# Patient Record
Sex: FEMALE | Race: BLACK OR AFRICAN AMERICAN | NOT HISPANIC OR LATINO | Employment: OTHER | URBAN - NONMETROPOLITAN AREA
[De-identification: names, ages, dates, MRNs, and addresses within clinical notes are randomized per-mention and may not be internally consistent; named-entity substitution may affect disease eponyms.]

---

## 2020-03-28 ENCOUNTER — HISTORICAL (OUTPATIENT)
Dept: ADMINISTRATIVE | Facility: HOSPITAL | Age: 60
End: 2020-03-28

## 2020-11-01 ENCOUNTER — HISTORICAL (OUTPATIENT)
Dept: ADMINISTRATIVE | Facility: HOSPITAL | Age: 60
End: 2020-11-01

## 2020-12-04 ENCOUNTER — HISTORICAL (OUTPATIENT)
Dept: ADMINISTRATIVE | Facility: HOSPITAL | Age: 60
End: 2020-12-04

## 2020-12-31 ENCOUNTER — HISTORICAL (OUTPATIENT)
Dept: ADMINISTRATIVE | Facility: HOSPITAL | Age: 60
End: 2020-12-31

## 2021-01-12 ENCOUNTER — HISTORICAL (OUTPATIENT)
Dept: ADMINISTRATIVE | Facility: HOSPITAL | Age: 61
End: 2021-01-12

## 2022-03-25 LAB
AGE: 63
ALBUMIN SERPL-MCNC: 4.7 G/DL (ref 3.4–5)
ALBUMIN/GLOB SERPL: 1.6 {RATIO}
ALP SERPL-CCNC: 134 U/L (ref 50–144)
ALT SERPL-CCNC: 21 U/L (ref 1–45)
ANION GAP SERPL CALC-SCNC: 8 MMOL/L (ref 2–13)
AST SERPL-CCNC: 29 U/L (ref 14–36)
BASOPHILS # BLD AUTO: 0.05 10*3/UL (ref 0.01–0.08)
BASOPHILS NFR BLD AUTO: 1.2 % (ref 0.1–1.2)
BILIRUB SERPL-MCNC: 0.51 MG/DL (ref 0–1)
BUN SERPL-MCNC: 16 MG/DL (ref 7–20)
CALCIUM SERPL-MCNC: 9.8 MG/DL (ref 8.4–10.2)
CHLORIDE SERPL-SCNC: 106 MMOL/L (ref 94–110)
CHOLEST SERPL-MCNC: 146 MG/DL (ref 0–200)
CO2 SERPL-SCNC: 27 MMOL/L (ref 21–32)
CREAT SERPL-MCNC: 0.94 MG/DL (ref 0.52–1.04)
CREAT/UREA NIT SERPL: 17 (ref 12–20)
EOSINOPHIL # BLD AUTO: 0.21 10*3/UL (ref 0.04–0.36)
EOSINOPHIL NFR BLD AUTO: 5.1 % (ref 0.7–7)
ERYTHROCYTE [DISTWIDTH] IN BLOOD BY AUTOMATED COUNT: 14.6 % (ref 11–14.5)
EST. AVERAGE GLUCOSE BLD GHB EST-MCNC: 150 MG/DL (ref 70–115)
GLOBULIN SER-MCNC: 3 G/DL (ref 2–3.9)
GLUCOSE SERPL-MCNC: 97 MG/DL (ref 70–115)
HBA1C MFR BLD: 7 % (ref 4–6)
HCT VFR BLD AUTO: 41.7 % (ref 36–48)
HDLC SERPL-MCNC: 47 MG/DL (ref 40–60)
HGB BLD-MCNC: 13.7 G/DL (ref 11.8–16)
IMM GRANULOCYTES # BLD AUTO: 0 10*3/UL (ref 0–0.03)
IMM GRANULOCYTES NFR BLD AUTO: 0 % (ref 0–0.5)
LDLC SERPL CALC-MCNC: 65.5 MG/DL (ref 30–100)
LYMPHOCYTES # BLD AUTO: 1.7 10*3/UL (ref 1.16–3.74)
LYMPHOCYTES NFR BLD AUTO: 41.2 % (ref 20–55)
MANUAL DIFF? (OHS): NORMAL
MCH RBC QN AUTO: 27.1 PG (ref 27–34)
MCHC RBC AUTO-ENTMCNC: 32.9 G/DL (ref 31–37)
MCV RBC AUTO: 82.6 FL (ref 79–99)
MONOCYTES # BLD AUTO: 0.43 10*3/UL (ref 0.24–0.36)
MONOCYTES NFR BLD AUTO: 10.4 % (ref 4.7–12.5)
NEUTROPHILS # BLD AUTO: 1.74 10*3/UL (ref 1.56–6.13)
NEUTROPHILS NFR BLD AUTO: 42.1 % (ref 37–73)
PLATELET # BLD AUTO: 393 10*3/UL (ref 140–371)
PMV BLD AUTO: 11.3 FL (ref 9.4–12.4)
POTASSIUM SERPL-SCNC: 5.9 MMOL/L (ref 3.5–5.1)
PROT SERPL-MCNC: 7.7 G/DL (ref 6.3–8.2)
RBC # BLD AUTO: 5.05 10*6/UL (ref 4–5.1)
SODIUM SERPL-SCNC: 141 MMOL/L (ref 135–145)
TRIGL SERPL-MCNC: 106 MG/DL (ref 30–200)
TSH SERPL-ACNC: 2.35 M[IU]/L (ref 0.36–3.74)
WBC # SPEC AUTO: 4.1 10*3/UL (ref 4–11.5)

## 2022-05-15 ENCOUNTER — HISTORICAL (OUTPATIENT)
Dept: ADMINISTRATIVE | Facility: HOSPITAL | Age: 62
End: 2022-05-15

## 2022-05-21 NOTE — HISTORICAL [facility]ER
This is a historical note converted from Ramses. Formatting and pictures may have been removed.  Please reference Ramses for original formatting and attached multimedia. Chief Complaint  Est. Care-HTN, DM  History of Present Illness  61 BF PMH HTN, CAD, A. fib, DM, MDD,?Mild mitral valve regurgitation left ventricular systolic dysfunction, venous insufficiency bilateral lower extremities, former?smoker, history of DVT in office to establish care.?She has no complaints today.  ?  She is established with cardiology, Dr. Farr.??She was last seen on 3/15/2022 and instructed to follow up in 4 months.? She did quit smoking about 2 months ago.? She used to be a 1/2 PPD since she was 14 years old.?  ?  She does check her blood sugar about once a week.? Runs about 130.? Reports depression is controlled with Wellbutrin and has been for years.? If she stops the medications then she gets to thinking about?how shes getting older and this makes her feel very sad.? She denies SI/HI.? She takes Tylenol PM for sleep and works well.  Review of Systems  Constitutional:?no fever, no fatigue, no weakness  Respiratory:?no cough, no wheezing, no shortness of breath  Cardiovascular:?no chest pain, no palpitations, no edema  Gastrointestinal:?no nausea, no vomiting, no diarrhea, no constipation. No abdominal pain  Musculoskeletal:?no muscle/joint pain, no joint swelling  Neurologic: no headache, no dizziness, no weakness or numbness  Psych:?no depression, no anxiety, no trouble sleeping  Physical Exam  Vitals & Measurements  T:?36.7? ?C (Oral)? HR:?61(Peripheral)? BP:?90/60? SpO2:?95%?  HT:?168.00?cm? WT:?107.100?kg? BMI:?37.95?  General:?overweight in no acute distress  Neck: full range of motion, no thyromegaly or lymphadenopathy  Respiratory:?clear to auscultation bilaterally  Cardiovascular:?regular rate and rhythm without murmurs, gallops or rubs  Gastrointestinal:?soft, non-tender, non-distended with normal bowel sounds, without  masses to palpation  Musculoskeletal:?full range of motion of all extremities/spine without limitation or discomfort  Integumentary: no rashes or skin lesions present  Neurologic: cranial nerves intact, no signs of peripheral neurological deficit, motor/sensory function intact  Assessment/Plan  1.?HTN - Hypertension ? I10  2.?DM - Diabetes mellitus ? E11.9  3.?Asthma ? J45.909  4.?HLD (hyperlipidemia) ? E78.5  5.?Chronic atrial fibrillation ? I48.20  6.?Mild mitral valve regurgitation ? I34.0  7.?Left ventricular systolic dysfunction ? I51.9  8.?Former smoker ? Z87.891  9.?History of DVT of lower extremity ? Z86.718  10.?Chronic venous insufficiency ? I87.2  Orders:  Clinic Follow up, *Est. 04/15/22 3:00:00 CDT, Order for future visit, HTN - Hypertension  DM - Diabetes mellitus  Asthma  HLD (hyperlipidemia)  Chronic atrial fibrillation  Mild mitral valve regurgitation  Left ventricular systolic dysfunction  Former smoker  H...  Office/Outpatient Visit Level 2 [facility] [id number] , HTN - Hypertension  DM - Diabetes mellitus  Asthma  HLD (hyperlipidemia)  Chronic atrial fibrillation  Mild mitral valve regurgitation  Left ventricular systolic dysfunction  Former smoker  History of DVT of lower extremity  Chronic venous in...  ?  continue current medications  keep specialist appointments  fasting labs to be collected today  RTC 2-3 weeks for results and wellness visit.  ?  Referrals  Clinic Follow up, *Est. 04/15/22 3:00:00 CDT, Order for future visit, HTN - Hypertension  DM - Diabetes mellitus  Asthma  HLD (hyperlipidemia)  Chronic atrial fibrillation  Mild mitral valve regurgitation  Left ventricular systolic dysfunction  Former smoker  H...   Problem List/Past Medical History  Ongoing  Asthma  Chronic atrial fibrillation  Chronic venous insufficiency  DM - Diabetes mellitus  Former smoker  History of DVT of lower extremity  HLD (hyperlipidemia)  HTN - Hypertension  Left ventricular systolic  dysfunction  Mild mitral valve regurgitation  Historical  No qualifying data  Procedure/Surgical History  Hysterectomy   Medications  aspirin 81 mg oral Delayed Release (EC) tablet, 81 mg= 1 tab(s), Oral, Daily  atorvastatin 40 mg oral tablet, 40 mg= 1 tab(s), Oral, Daily  buPROPion 100 mg oral tablet, 100 mg= 1 tab(s), Oral, Daily  carvedilol 25 mg oral tablet, 37.5 mg= 1.5 tab(s), Oral, BID  Eliquis 5 mg oral tablet, 5 mg= 1 tab(s), Oral, BID  Entresto 49 mg-51 mg oral tablet, 0.5 tab(s), Oral, BID  isosorbide MONOnitrate 30 mg oral tablet, Extended Release, 30 mg= 1 tab(s), Oral, Once  Jardiance 10 mg oral tablet, 10 mg= 1 tab(s), Oral, Daily  ProAir HFA 90 mcg/inh inhalation aerosol with adapter, 2 puff(s), INH, q4hr, PRN  Tylenol Extra Strength  mg-25 mg oral tablet, 2 tab(s), Oral, Once a day (at bedtime), PRN  Allergies  No Known Medication Allergies  Social History  Abuse/Neglect  No, 03/25/2022  Tobacco  Former smoker, quit more than 30 days ago, No, 03/25/2022  Family History  Brain tumor: Brother.  Diabetes mellitus type 2: Brother.  Heart disease: Mother.  Renal failure syndrome.: Brother.  Immunizations  Vaccine Date Status   COVID-19 mRNA, LNP-S, PF - Moderna 12/01/2021 Recorded   influenza virus vaccine, inactivated 09/24/2021 Recorded   COVID-19 MRNA, LNP-S, PF- Pfizer 03/26/2021 Recorded   COVID-19 MRNA, LNP-S, PF- Pfizer 03/05/2021 Recorded   influenza virus vaccine, inactivated 10/07/2019 Recorded   influenza virus vaccine, inactivated 11/14/2018 Recorded   influenza virus vaccine, inactivated 01/20/2018 Recorded   Health Maintenance  Health Maintenance  ???Pending?(in the next year)  ??? ??OverDue  ??? ? ? ?Influenza Vaccine due??10/01/21??and every 1??day(s)  ??? ? ? ?Alcohol Misuse Screening due??01/02/22??and every 1??year(s)  ??? ? ? ?Colorectal Screening due??01/15/22??and every 1??year(s)  ??? ??Due?  ??? ? ? ?ADL Screening due??03/25/22??and every 1??year(s)  ??? ? ? ?Asthma  Management-Asthma Education due??03/25/22??and every 6??month(s)  ??? ? ? ?Asthma Management-Spirometry due??03/25/22??Variable frequency  ??? ? ? ?Asthma Management-Ross Peak Flow due??03/25/22??Variable frequency  ??? ? ? ?Asthma Management-Written Action Plan due??03/25/22??and every 6??month(s)  ??? ? ? ?Breast Cancer Screening due??03/25/22??Unknown Frequency  ??? ? ? ?Diabetes Maintenance-Eye Exam due??03/25/22??Unknown Frequency  ??? ? ? ?Diabetes Maintenance-Fasting Lipid Profile due??03/25/22??Variable frequency  ??? ? ? ?Diabetes Maintenance-Foot Exam due??03/25/22??Unknown Frequency  ??? ? ? ?Diabetes Maintenance-HgbA1c due??03/25/22??Unknown Frequency  ??? ? ? ?Diabetes Maintenance-Serum Creatinine due??03/25/22??Variable frequency  ??? ? ? ?HF-Heart Failure Education due??03/25/22??Unknown Frequency  ??? ? ? ?HF-LVEF due??03/25/22??Unknown Frequency  ??? ? ? ?Hypertension Management-Education due??03/25/22??and every 1??year(s)  ??? ? ? ?Medicare Annual Wellness Exam due??03/25/22??and every 1??year(s)  ??? ? ? ?Tetanus Vaccine due??03/25/22??and every 10??year(s)  ??? ? ? ?Zoster Vaccine due??03/25/22??Unknown Frequency  ??? ??Due In Future?  ??? ? ? ?Obesity Screening not due until??01/01/23??and every 1??year(s)  ??? ? ? ?Hypertension Management-BMP not due until??03/07/23??and every 1??year(s)  ???Satisfied?(in the past 1 year)  ??? ??Satisfied?  ??? ? ? ?Aspirin Therapy for CVD Prevention on??03/25/22.  ??? ? ? ?Asthma Management-Asthma Medication Prescribed on??03/25/22.  ??? ? ? ?Blood Pressure Screening on??03/25/22.??Satisfied by Roslyn Deleon.  ??? ? ? ?Body Mass Index Check on??03/25/22.??Satisfied by Roslyn Deleon.  ??? ? ? ?Coronary Artery Disease Maintenance-Antiplatelet Agent Prescribed on??03/25/22.  ??? ? ? ?Depression Screening on??03/25/22.??Satisfied by Roslyn Deleon.  ??? ? ? ?Diabetes Maintenance-Medication Prescribed on??03/25/22.  ??? ? ? ?Hypertension  Management-Blood Pressure on??03/25/22.??Satisfied by Roslyn Deleon.  ??? ? ? ?Influenza Vaccine on??03/25/22.??Satisfied by Roslyn Deleon.  ??? ? ? ?Obesity Screening on??03/25/22.??Satisfied by Roslyn Deleon.  ?

## 2022-06-07 LAB — NONINV COLON CA DNA+OCC BLD SCRN STL QL: NEGATIVE

## 2022-06-10 ENCOUNTER — HISTORICAL (OUTPATIENT)
Dept: ADMINISTRATIVE | Facility: HOSPITAL | Age: 62
End: 2022-06-10

## 2023-01-22 ENCOUNTER — HOSPITAL ENCOUNTER (EMERGENCY)
Facility: HOSPITAL | Age: 63
Discharge: HOME | End: 2023-01-22
Attending: FAMILY MEDICINE
Payer: COMMERCIAL

## 2023-01-22 VITALS
HEIGHT: 66 IN | SYSTOLIC BLOOD PRESSURE: 132 MMHG | WEIGHT: 256 LBS | TEMPERATURE: 97 F | DIASTOLIC BLOOD PRESSURE: 97 MMHG | BODY MASS INDEX: 41.14 KG/M2 | OXYGEN SATURATION: 100 % | RESPIRATION RATE: 22 BRPM | HEART RATE: 72 BPM

## 2023-01-22 DIAGNOSIS — I48.11 LONGSTANDING PERSISTENT ATRIAL FIBRILLATION: Primary | ICD-10-CM

## 2023-01-22 DIAGNOSIS — R06.02 SOB (SHORTNESS OF BREATH): ICD-10-CM

## 2023-01-22 DIAGNOSIS — R07.9 CHEST PAIN: ICD-10-CM

## 2023-01-22 DIAGNOSIS — I50.9 CONGESTIVE HEART FAILURE, UNSPECIFIED HF CHRONICITY, UNSPECIFIED HEART FAILURE TYPE: ICD-10-CM

## 2023-01-22 LAB
ALBUMIN SERPL-MCNC: 4.2 G/DL (ref 3.4–5)
ALBUMIN/GLOB SERPL: 1.2 RATIO
ALP SERPL-CCNC: 125 UNIT/L (ref 50–144)
ALT SERPL-CCNC: 27 UNIT/L (ref 1–45)
AST SERPL-CCNC: 28 UNIT/L (ref 14–36)
BASOPHILS # BLD AUTO: 0.04 X10(3)/MCL (ref 0.01–0.08)
BASOPHILS NFR BLD AUTO: 0.9 % (ref 0.1–1.2)
BILIRUBIN DIRECT+TOT PNL SERPL-MCNC: 0.6 MG/DL (ref 0–1)
BNP BLD-MCNC: 161 PG/ML (ref 10–450)
BUN SERPL-MCNC: 8 MG/DL (ref 7–20)
CALCIUM SERPL-MCNC: 9.4 MG/DL (ref 8.4–10.2)
CHLORIDE SERPL-SCNC: 107 MMOL/L (ref 98–110)
CO2 SERPL-SCNC: 27 MMOL/L (ref 21–32)
CREAT SERPL-MCNC: 0.94 MG/DL (ref 0.66–1.25)
EOSINOPHIL # BLD AUTO: 0.27 X10(3)/MCL (ref 0.04–0.36)
EOSINOPHIL NFR BLD AUTO: 6.3 % (ref 0.7–7)
ERYTHROCYTE [DISTWIDTH] IN BLOOD BY AUTOMATED COUNT: 15 % (ref 11–14.5)
GFR SERPLBLD CREATININE-BSD FMLA CKD-EPI: 68 MLS/MIN/1.73/M2
GLOBULIN SER-MCNC: 3.5 GM/DL (ref 2–3.9)
GLUCOSE SERPL-MCNC: 119 MG/DL (ref 70–115)
HCT VFR BLD AUTO: 38.4 % (ref 36–48)
HGB BLD-MCNC: 12.9 GM/DL (ref 11.8–16)
IMM GRANULOCYTES # BLD AUTO: 0.01 X10(3)/MCL (ref 0–0.03)
IMM GRANULOCYTES NFR BLD AUTO: 0.2 % (ref 0–0.5)
INR BLD: 0.99
LYMPHOCYTES # BLD AUTO: 1.7 X10(3)/MCL (ref 1.16–3.74)
LYMPHOCYTES NFR BLD AUTO: 39.4 % (ref 20–55)
MCH RBC QN AUTO: 26.7 PG (ref 27–34)
MCV RBC AUTO: 79.3 FL (ref 79–99)
MEAN CELL HEMOGLOBIN CONCENTRATION (OHS) G/DL: 33.6 G/DL (ref 31–37)
MONOCYTES # BLD AUTO: 0.44 X10(3)/MCL (ref 0.24–0.36)
MONOCYTES NFR BLD AUTO: 10.2 % (ref 4.7–12.5)
NEUTROPHILS # BLD AUTO: 1.86 X10(3)/MCL (ref 1.56–6.13)
NEUTROPHILS NFR BLD AUTO: 43 % (ref 37–73)
NRBC BLD AUTO-RTO: 0 % (ref 0–1)
PLATELET # BLD AUTO: 363 X10(3)/MCL (ref 140–371)
PMV BLD AUTO: 11.2 FL (ref 9.4–12.4)
POTASSIUM SERPL-SCNC: 4.4 MMOL/L (ref 3.5–5.1)
PROT SERPL-MCNC: 7.7 GM/DL (ref 6.3–8.2)
PROTHROMBIN TIME: 10.1 SECONDS
RBC # BLD AUTO: 4.84 X10(6)/MCL (ref 4–5.1)
SODIUM SERPL-SCNC: 139 MMOL/L (ref 135–145)
TROPONIN I SERPL-MCNC: <0.012 NG/ML (ref 0–0.03)
WBC # SPEC AUTO: 4.3 X10(3)/MCL (ref 4–11.5)

## 2023-01-22 PROCEDURE — 93010 EKG 12-LEAD: ICD-10-PCS | Mod: ,,, | Performed by: STUDENT IN AN ORGANIZED HEALTH CARE EDUCATION/TRAINING PROGRAM

## 2023-01-22 PROCEDURE — 36415 COLL VENOUS BLD VENIPUNCTURE: CPT | Performed by: FAMILY MEDICINE

## 2023-01-22 PROCEDURE — 83880 ASSAY OF NATRIURETIC PEPTIDE: CPT | Performed by: FAMILY MEDICINE

## 2023-01-22 PROCEDURE — 84484 ASSAY OF TROPONIN QUANT: CPT | Performed by: FAMILY MEDICINE

## 2023-01-22 PROCEDURE — 85025 COMPLETE CBC W/AUTO DIFF WBC: CPT | Performed by: FAMILY MEDICINE

## 2023-01-22 PROCEDURE — 93005 ELECTROCARDIOGRAM TRACING: CPT

## 2023-01-22 PROCEDURE — 93010 ELECTROCARDIOGRAM REPORT: CPT | Mod: ,,, | Performed by: STUDENT IN AN ORGANIZED HEALTH CARE EDUCATION/TRAINING PROGRAM

## 2023-01-22 PROCEDURE — 99285 EMERGENCY DEPT VISIT HI MDM: CPT | Mod: 25

## 2023-01-22 PROCEDURE — 80053 COMPREHEN METABOLIC PANEL: CPT | Performed by: FAMILY MEDICINE

## 2023-01-22 PROCEDURE — 85610 PROTHROMBIN TIME: CPT | Performed by: FAMILY MEDICINE

## 2023-01-22 RX ORDER — BUPROPION HYDROCHLORIDE 100 MG/1
100 TABLET ORAL DAILY
COMMUNITY
Start: 2022-12-05 | End: 2023-02-28 | Stop reason: SDUPTHER

## 2023-01-22 RX ORDER — ASPIRIN 81 MG/1
81 TABLET ORAL DAILY
COMMUNITY
Start: 2022-03-25

## 2023-01-22 RX ORDER — ISOSORBIDE MONONITRATE 30 MG/1
30 TABLET, EXTENDED RELEASE ORAL DAILY
COMMUNITY
Start: 2022-11-16 | End: 2023-05-09 | Stop reason: SDUPTHER

## 2023-01-22 RX ORDER — SACUBITRIL AND VALSARTAN 49; 51 MG/1; MG/1
1 TABLET, FILM COATED ORAL 2 TIMES DAILY
COMMUNITY
Start: 2022-12-05 | End: 2023-02-28 | Stop reason: DRUGHIGH

## 2023-01-22 RX ORDER — ATORVASTATIN CALCIUM 40 MG/1
40 TABLET, FILM COATED ORAL NIGHTLY
COMMUNITY
Start: 2022-03-25

## 2023-01-22 RX ORDER — CARVEDILOL 25 MG/1
25 TABLET ORAL
COMMUNITY
Start: 2022-03-25 | End: 2023-07-19 | Stop reason: SDUPTHER

## 2023-01-22 RX ORDER — EMPAGLIFLOZIN 10 MG/1
10 TABLET, FILM COATED ORAL
COMMUNITY
Start: 2022-11-16 | End: 2023-02-28

## 2023-01-22 RX ORDER — ALBUTEROL SULFATE 90 UG/1
2 AEROSOL, METERED RESPIRATORY (INHALATION) EVERY 4 HOURS PRN
COMMUNITY
Start: 2022-03-25 | End: 2023-10-16 | Stop reason: SDUPTHER

## 2023-01-22 NOTE — ED PROVIDER NOTES
Encounter Date: 1/22/2023       History     Chief Complaint   Patient presents with    Shortness of Breath     C/o sob that is worse when lying down flat for the past couple weeks, pt stopped taking her jardiance a couple weeks ago but restarted it x 3 days ago     c/o sob that is worse when lying down flat for the past couple weeks, pt stopped taking her jardiance a couple weeks ago but restarted it x 3 days ago pt has hx of atrial fibrillation she follows the cardiologist pt is on eliquis and carvedilol for rate control       Review of patient's allergies indicates:  No Known Allergies  Past Medical History:   Diagnosis Date    Anxiety disorder, unspecified     CHF (congestive heart failure)     COPD (chronic obstructive pulmonary disease)     Diabetes mellitus      No past surgical history on file.  No family history on file.  Social History     Tobacco Use    Smoking status: Former     Types: Cigarettes    Smokeless tobacco: Never   Substance Use Topics    Alcohol use: Not Currently     Comment: pt quit drinking approx 3 weeks ago     Review of Systems   Constitutional:  Positive for diaphoresis.   Respiratory:  Positive for chest tightness and shortness of breath.    Cardiovascular:  Positive for palpitations.   Musculoskeletal:  Positive for neck pain.   All other systems reviewed and are negative.    Physical Exam     Initial Vitals [01/22/23 1239]   BP Pulse Resp Temp SpO2   118/83 92 18 97.2 °F (36.2 °C) 97 %      MAP       --         Physical Exam    Constitutional: She appears well-developed.   HENT:   Head: Normocephalic.   Eyes: Pupils are equal, round, and reactive to light.   Neck:   Normal range of motion.  Cardiovascular:  Normal rate and regular rhythm.           No murmur heard.  Pulmonary/Chest: Breath sounds normal. No respiratory distress. She has no wheezes. She has no rhonchi. She has no rales.   Musculoskeletal:         General: Normal range of motion.      Cervical back: Normal range of  motion.     Neurological: She is alert and oriented to person, place, and time.   Skin: Skin is warm.   Psychiatric: She has a normal mood and affect.       ED Course   Procedures  Labs Reviewed   COMPREHENSIVE METABOLIC PANEL - Abnormal; Notable for the following components:       Result Value    Glucose Level 119 (*)     All other components within normal limits   CBC WITH DIFFERENTIAL - Abnormal; Notable for the following components:    MCH 26.7 (*)     RDW 15.0 (*)     Mono # 0.44 (*)     All other components within normal limits   TROPONIN I - Normal   NT-PRO NATRIURETIC PEPTIDE - Normal   CBC W/ AUTO DIFFERENTIAL    Narrative:     The following orders were created for panel order CBC auto differential.  Procedure                               Abnormality         Status                     ---------                               -----------         ------                     CBC with Differential[[id number]]        Abnormal            Final result                 Please view results for these tests on the individual orders.   PROTIME-INR    Narrative:     Protimes are used to monitor anticoagulant agents such as warfarin. PT INR values are based on the current patient normal mean and the DASHAWN value for the specific instrument reagent used.  **Routine theraputic target values for the INR are 2.0-3.0**     EKG Readings: (Independently Interpreted)   Initial Reading: No STEMI. Rhythm: Atrial Fibrillation. Ectopy: No Ectopy.   Atrial fibrillation     Imaging Results              X-Ray Chest AP Portable (In process)                   X-Rays:   Independently Interpreted Readings:   Other Readings:  Pulmonary congestion ? Habitus changes   Medications - No data to display  Medical Decision Making:   Initial Assessment:   1 atrial fibrillation     Clinical Tests:   Lab Tests: Ordered and Reviewed  The following lab test(s) were unremarkable: CBC, CMP, Troponin and BNP       <> Summary of Lab: All labs normal bnp normal  ekg atrial fib   Radiological Study: Ordered and Reviewed  Medical Tests: Ordered and Reviewed  ED Management:  Follow up with cardiology                         Clinical Impression:   Final diagnoses:  [R07.9] Chest pain  [R06.02] SOB (shortness of breath)  [I48.11] Longstanding persistent atrial fibrillation (Primary)  [I50.9] Congestive heart failure, unspecified HF chronicity, unspecified heart failure type        ED Disposition Condition    Discharge Stable          ED Prescriptions    None       Follow-up Information       Follow up With Specialties Details Why Contact Info      In 1 week               Deedee Hernandez MD  01/22/23 5740

## 2023-01-30 ENCOUNTER — DOCUMENTATION ONLY (OUTPATIENT)
Dept: ADMINISTRATIVE | Facility: HOSPITAL | Age: 63
End: 2023-01-30
Payer: COMMERCIAL

## 2023-02-13 ENCOUNTER — TELEPHONE (OUTPATIENT)
Dept: FAMILY MEDICINE | Facility: CLINIC | Age: 63
End: 2023-02-13
Payer: COMMERCIAL

## 2023-02-14 ENCOUNTER — TELEPHONE (OUTPATIENT)
Dept: FAMILY MEDICINE | Facility: CLINIC | Age: 63
End: 2023-02-14
Payer: COMMERCIAL

## 2023-02-14 NOTE — TELEPHONE ENCOUNTER
----- Message from Liz Istre sent at 2/13/2023  3:01 PM CST -----  Regarding: refills  Pt says she called to get a refill on these supplies last week, says she called them and they told her that they didn't get a request for them from us. Pt says she needs this stuff to check her blood sugar.    Needles   Test strips          [facility] mail service  [phone]

## 2023-02-15 ENCOUNTER — TELEPHONE (OUTPATIENT)
Dept: FAMILY MEDICINE | Facility: CLINIC | Age: 63
End: 2023-02-15
Payer: COMMERCIAL

## 2023-02-15 NOTE — TELEPHONE ENCOUNTER
----- Message from Kiki Recinos sent at 2/15/2023  2:35 PM CST -----  Regarding: answering Service  Patient called and left a message with the answering service wanting to know if her Diabetic Medications was called in.     Please call     Lakesha Caballero  [phone]

## 2023-02-17 VITALS
WEIGHT: 247.56 LBS | DIASTOLIC BLOOD PRESSURE: 68 MMHG | BODY MASS INDEX: 39.79 KG/M2 | OXYGEN SATURATION: 96 % | HEIGHT: 66 IN | HEART RATE: 96 BPM | SYSTOLIC BLOOD PRESSURE: 116 MMHG | TEMPERATURE: 98 F

## 2023-02-17 RX ORDER — DEXTROMETHORPHAN POLISTIREX 30 MG/5ML
60 SUSPENSION ORAL 2 TIMES DAILY
COMMUNITY
Start: 2022-11-01 | End: 2023-02-28

## 2023-02-17 RX ORDER — DULOXETIN HYDROCHLORIDE 30 MG/1
30 CAPSULE, DELAYED RELEASE ORAL DAILY
COMMUNITY
Start: 2022-11-01 | End: 2023-02-28

## 2023-02-17 RX ORDER — DEXTROMETHORPHAN HYDROBROMIDE, GUAIFENESIN 5; 100 MG/5ML; MG/5ML
1300 LIQUID ORAL EVERY 8 HOURS PRN
COMMUNITY
Start: 2022-11-01

## 2023-02-17 RX ORDER — CHLORPHENIRAMINE MALEATE 4 MG
4 TABLET ORAL 4 TIMES DAILY
COMMUNITY
Start: 2022-11-01 | End: 2023-02-28

## 2023-02-22 ENCOUNTER — TELEPHONE (OUTPATIENT)
Dept: FAMILY MEDICINE | Facility: CLINIC | Age: 63
End: 2023-02-22
Payer: COMMERCIAL

## 2023-02-22 DIAGNOSIS — E11.9 TYPE 2 DIABETES MELLITUS WITHOUT COMPLICATION, WITH LONG-TERM CURRENT USE OF INSULIN: Primary | ICD-10-CM

## 2023-02-22 DIAGNOSIS — Z79.4 TYPE 2 DIABETES MELLITUS WITHOUT COMPLICATION, WITH LONG-TERM CURRENT USE OF INSULIN: Primary | ICD-10-CM

## 2023-02-22 NOTE — TELEPHONE ENCOUNTER
----- Message from Liz Cisneros sent at 2/22/2023 11:48 AM CST -----  Regarding: refill  [facility] pharmacy called says pt called and put in a request for a refill on her test strips, but still hasn't gotten anything from them, [facility] pharmacy said when they tried to fill the test strips it says they are discontinued, so they are wanting to know what needs to be done now.      Test strips     [facility] pharmacy        [phone]

## 2023-02-23 ENCOUNTER — TELEPHONE (OUTPATIENT)
Dept: FAMILY MEDICINE | Facility: CLINIC | Age: 63
End: 2023-02-23
Payer: COMMERCIAL

## 2023-02-23 NOTE — TELEPHONE ENCOUNTER
----- Message from Kiki Recinos sent at 2/23/2023  4:27 PM CST -----  Regarding: Answering Service  Patient left a message with the answering service stating she is needing assistance getting test strips to test her blood sugar    Lakesha Caballero  [phone]

## 2023-02-28 ENCOUNTER — OFFICE VISIT (OUTPATIENT)
Dept: FAMILY MEDICINE | Facility: CLINIC | Age: 63
End: 2023-02-28
Payer: COMMERCIAL

## 2023-02-28 VITALS
OXYGEN SATURATION: 98 % | HEART RATE: 75 BPM | WEIGHT: 255.75 LBS | HEIGHT: 68 IN | DIASTOLIC BLOOD PRESSURE: 76 MMHG | SYSTOLIC BLOOD PRESSURE: 128 MMHG | TEMPERATURE: 97 F | BODY MASS INDEX: 38.76 KG/M2

## 2023-02-28 DIAGNOSIS — I48.20 CHRONIC ATRIAL FIBRILLATION: ICD-10-CM

## 2023-02-28 DIAGNOSIS — I10 PRIMARY HYPERTENSION: ICD-10-CM

## 2023-02-28 DIAGNOSIS — I25.10 CORONARY ARTERY DISEASE, UNSPECIFIED VESSEL OR LESION TYPE, UNSPECIFIED WHETHER ANGINA PRESENT, UNSPECIFIED WHETHER NATIVE OR TRANSPLANTED HEART: ICD-10-CM

## 2023-02-28 DIAGNOSIS — F32.9 MAJOR DEPRESSIVE DISORDER, REMISSION STATUS UNSPECIFIED, UNSPECIFIED WHETHER RECURRENT: ICD-10-CM

## 2023-02-28 DIAGNOSIS — G89.29 OTHER CHRONIC PAIN: ICD-10-CM

## 2023-02-28 DIAGNOSIS — F41.1 GAD (GENERALIZED ANXIETY DISORDER): ICD-10-CM

## 2023-02-28 DIAGNOSIS — I87.2 CHRONIC VENOUS INSUFFICIENCY: ICD-10-CM

## 2023-02-28 DIAGNOSIS — E11.59 TYPE 2 DIABETES MELLITUS WITH OTHER CIRCULATORY COMPLICATION, WITHOUT LONG-TERM CURRENT USE OF INSULIN: Primary | ICD-10-CM

## 2023-02-28 PROBLEM — E78.5 HYPERLIPIDEMIA: Status: ACTIVE | Noted: 2023-02-28

## 2023-02-28 PROBLEM — Z86.718 HISTORY OF DEEP VENOUS THROMBOSIS: Status: ACTIVE | Noted: 2023-02-28

## 2023-02-28 PROBLEM — I51.9 LEFT VENTRICULAR SYSTOLIC DYSFUNCTION: Status: ACTIVE | Noted: 2023-02-28

## 2023-02-28 PROBLEM — J45.909 ASTHMA: Status: ACTIVE | Noted: 2023-02-28

## 2023-02-28 PROBLEM — I34.0 MILD MITRAL VALVE REGURGITATION: Status: ACTIVE | Noted: 2023-02-28

## 2023-02-28 PROBLEM — E11.9 DIABETES MELLITUS, TYPE 2: Status: ACTIVE | Noted: 2023-02-28

## 2023-02-28 PROBLEM — I48.91 AFIB: Status: ACTIVE | Noted: 2023-02-28

## 2023-02-28 PROBLEM — I51.89 LEFT VENTRICULAR SYSTOLIC DYSFUNCTION: Status: ACTIVE | Noted: 2023-02-28

## 2023-02-28 PROBLEM — I50.9 CONGESTIVE HEART FAILURE: Status: ACTIVE | Noted: 2023-02-28

## 2023-02-28 PROBLEM — M54.50 LOW BACK PAIN: Status: ACTIVE | Noted: 2023-02-28

## 2023-02-28 LAB
EST. AVERAGE GLUCOSE BLD GHB EST-MCNC: 151.3 MG/DL (ref 70–115)
HBA1C MFR BLD: 6.9 % (ref 4–6)

## 2023-02-28 PROCEDURE — 3074F SYST BP LT 130 MM HG: CPT | Mod: CPTII,,, | Performed by: NURSE PRACTITIONER

## 2023-02-28 PROCEDURE — 99214 PR OFFICE/OUTPT VISIT, EST, LEVL IV, 30-39 MIN: ICD-10-PCS | Mod: ,,, | Performed by: NURSE PRACTITIONER

## 2023-02-28 PROCEDURE — 3008F PR BODY MASS INDEX (BMI) DOCUMENTED: ICD-10-PCS | Mod: CPTII,,, | Performed by: NURSE PRACTITIONER

## 2023-02-28 PROCEDURE — 3078F PR MOST RECENT DIASTOLIC BLOOD PRESSURE < 80 MM HG: ICD-10-PCS | Mod: CPTII,,, | Performed by: NURSE PRACTITIONER

## 2023-02-28 PROCEDURE — 1159F MED LIST DOCD IN RCRD: CPT | Mod: CPTII,,, | Performed by: NURSE PRACTITIONER

## 2023-02-28 PROCEDURE — 4010F PR ACE/ARB THEARPY RXD/TAKEN: ICD-10-PCS | Mod: CPTII,,, | Performed by: NURSE PRACTITIONER

## 2023-02-28 PROCEDURE — 3078F DIAST BP <80 MM HG: CPT | Mod: CPTII,,, | Performed by: NURSE PRACTITIONER

## 2023-02-28 PROCEDURE — 1160F RVW MEDS BY RX/DR IN RCRD: CPT | Mod: CPTII,,, | Performed by: NURSE PRACTITIONER

## 2023-02-28 PROCEDURE — 4010F ACE/ARB THERAPY RXD/TAKEN: CPT | Mod: CPTII,,, | Performed by: NURSE PRACTITIONER

## 2023-02-28 PROCEDURE — 3074F PR MOST RECENT SYSTOLIC BLOOD PRESSURE < 130 MM HG: ICD-10-PCS | Mod: CPTII,,, | Performed by: NURSE PRACTITIONER

## 2023-02-28 PROCEDURE — 83036 HEMOGLOBIN GLYCOSYLATED A1C: CPT | Performed by: NURSE PRACTITIONER

## 2023-02-28 PROCEDURE — 1159F PR MEDICATION LIST DOCUMENTED IN MEDICAL RECORD: ICD-10-PCS | Mod: CPTII,,, | Performed by: NURSE PRACTITIONER

## 2023-02-28 PROCEDURE — 99214 OFFICE O/P EST MOD 30 MIN: CPT | Mod: ,,, | Performed by: NURSE PRACTITIONER

## 2023-02-28 PROCEDURE — 1160F PR REVIEW ALL MEDS BY PRESCRIBER/CLIN PHARMACIST DOCUMENTED: ICD-10-PCS | Mod: CPTII,,, | Performed by: NURSE PRACTITIONER

## 2023-02-28 PROCEDURE — 3008F BODY MASS INDEX DOCD: CPT | Mod: CPTII,,, | Performed by: NURSE PRACTITIONER

## 2023-02-28 RX ORDER — DULOXETIN HYDROCHLORIDE 30 MG/1
30 CAPSULE, DELAYED RELEASE ORAL DAILY
Qty: 90 CAPSULE | Refills: 0 | Status: SHIPPED | OUTPATIENT
Start: 2023-02-28 | End: 2023-06-20 | Stop reason: SDUPTHER

## 2023-02-28 RX ORDER — SACUBITRIL AND VALSARTAN 97; 103 MG/1; MG/1
1 TABLET, FILM COATED ORAL 2 TIMES DAILY
COMMUNITY
End: 2023-05-09 | Stop reason: SDUPTHER

## 2023-02-28 RX ORDER — BUPROPION HYDROCHLORIDE 100 MG/1
100 TABLET ORAL DAILY
Qty: 90 TABLET | Refills: 0 | Status: SHIPPED | OUTPATIENT
Start: 2023-02-28 | End: 2023-06-27

## 2023-02-28 NOTE — PROGRESS NOTES
"   Patient ID: [id number]     Chief Complaint: Follow-up (Check up)      HPI:     Lakesha Caballero is a 62 y.o. female here today for a past due follow up from 11/1/22 for pain, depression, and anxiety.  She was prescribed Cymbalta and instructed to return in a month.  The plan was to increase the Cymbalta to the max tolerated dose.  She thinks that the "nerve pill" helped her be less anxious.  When it ran out she did notice more anxiety.  She did not notify the office, and she did not follow up as instructed.  She does admit to some fluttering in her chest and at times it feels like air is being sucked out of her lungs.  Again she admits to anxiety.  She has gone to the emergency room on 01/22/2023 for shortness of breath.  She did subsequently follow up with Cardiology who stopped her Jardiance and increased her Entresto.  The patient reports vaginal bleeding and itching with the use of Jardiance.  She does continue to complain of joint pain, all over.  She is taking Tylenol once a day.      Past Medical History:  has a past medical history of Anxiety disorder, unspecified, Asthma, CAD (coronary artery disease), CHF (congestive heart failure), Chronic atrial fibrillation, Chronic pain, Chronic venous insufficiency, Diabetes mellitus, type 2, DVT, lower extremity, Hyperlipidemia, Hypertension, Left ventricular systolic dysfunction, Low back pain, MDD (major depressive disorder), and Mild mitral valve regurgitation.    Surgical History:  has a past surgical history that includes Hysterectomy.    Family History: family history includes Brain cancer in her brother; Diabetes type II in her brother; Heart disease in her mother; Kidney failure in her brother.    Social History:  reports that she quit smoking about 12 months ago. Her smoking use included cigarettes. She has never used smokeless tobacco. She reports that she does not currently use alcohol. She reports that she does not use drugs.    Current Outpatient " "Medications   Medication Instructions    acetaminophen (TYLENOL) 1,300 mg, Oral, Every 8 hours PRN    albuterol (PROVENTIL/VENTOLIN HFA) 90 mcg/actuation inhaler 2 puffs, Inhalation, Every 4 hours PRN    apixaban (ELIQUIS) 5 mg, Oral, 2 times daily    aspirin (ECOTRIN) 81 mg, Oral, Daily    atorvastatin (LIPITOR) 40 mg, Oral, Nightly    blood sugar diagnostic (TRUE METRIX GLUCOSE TEST STRIP) Strp 1 strip, Misc.(Non-Drug; Combo Route), 2 times daily    buPROPion (WELLBUTRIN) 100 mg, Oral, Daily    carvediloL (COREG) 25 mg, Oral, Take 1 and 1/2 tablets daily    DULoxetine (CYMBALTA) 30 mg, Oral, Daily    isosorbide mononitrate (IMDUR) 30 mg, Oral, Daily    lancets 18 gauge Misc 1 lancet, Misc.(Non-Drug; Combo Route), 2 times daily    sacubitriL-valsartan (ENTRESTO)  mg per tablet 1 tablet, Oral, 2 times daily       Patient has No Known Allergies.     Patient Care Team:  MIGUEL Dominique as PCP - General (Family Medicine)  Sahil Farr MD as Consulting Physician (Cardiology)  Kei Lantigua OD as Consulting Physician (Optometry)       Subjective:     Review of Systems    See HPI     Objective:     Visit Vitals  /76 (BP Location: Left arm, Patient Position: Sitting, BP Method: Medium (Manual))   Pulse 75   Temp 96.8 °F (36 °C) (Temporal)   Ht 5' 7.72" (1.72 m)   Wt 116 kg (255 lb 11.7 oz)   SpO2 98%   BMI 39.21 kg/m²       Physical Exam  Constitutional:       Appearance: She is obese.   HENT:      Head: Normocephalic and atraumatic.      Mouth/Throat:      Mouth: Mucous membranes are moist.   Cardiovascular:      Rate and Rhythm: Normal rate. Rhythm irregular.      Heart sounds: Murmur (at the apex) heard.   Pulmonary:      Effort: Pulmonary effort is normal.      Breath sounds: Normal breath sounds.   Musculoskeletal:      Cervical back: Normal range of motion and neck supple.   Skin:     General: Skin is warm and dry.      Capillary Refill: Capillary refill takes less than 2 seconds. "   Neurological:      General: No focal deficit present.      Mental Status: She is alert. Mental status is at baseline.   Psychiatric:         Attention and Perception: Attention normal.         Mood and Affect: Mood is anxious and depressed.         Speech: Speech normal.         Behavior: Behavior normal. Behavior is cooperative.       Labs Reviewed:     Chemistry:  Lab Results   Component Value Date     01/22/2023    K 4.4 01/22/2023    CHLORIDE 107 01/22/2023    BUN 8.0 01/22/2023    CREATININE 0.94 01/22/2023    EGFRNORACEVR 68 01/22/2023    GLUCOSE 119 (H) 01/22/2023    CALCIUM 9.4 01/22/2023    ALKPHOS 125 01/22/2023    LABPROT 7.7 01/22/2023    ALBUMIN 4.2 01/22/2023    AST 28 01/22/2023    ALT 27 01/22/2023    TSH 2.35 03/25/2022        Hematology:  Lab Results   Component Value Date    WBC 4.3 01/22/2023    RBC 4.84 01/22/2023    HGB 12.9 01/22/2023    HCT 38.4 01/22/2023    MCV 79.3 01/22/2023    MCH 26.7 (L) 01/22/2023    MCHC 33.6 01/22/2023    RDW 15.0 (H) 01/22/2023     01/22/2023    MPV 11.2 01/22/2023     HA1C 6.6  -  11/1/22      Assessment:       ICD-10-CM ICD-9-CM   1. Type 2 diabetes mellitus with other circulatory complication, without long-term current use of insulin  E11.59 250.70   2. Major depressive disorder, remission status unspecified, unspecified whether recurrent  F32.9 296.20   3. SHAVON (generalized anxiety disorder)  F41.1 300.02   4. Other chronic pain  G89.29 338.29   5. Coronary artery disease, unspecified vessel or lesion type, unspecified whether angina present, unspecified whether native or transplanted heart  I25.10 414.00   6. Primary hypertension  I10 401.9   7. Chronic atrial fibrillation  I48.20 427.31   8. Chronic venous insufficiency  I87.2 459.81        Plan:     Restart Cymbalta  Check HA1c today  We will get her the correct monitor and test strips so that she can check her blood sugar daily.  She was advised to notify the office for any fasting elevations  over 150.     Follow up in about 1 month (around 3/28/2023) for Anxiety/Depression Follow Up. In addition to their scheduled follow up, the patient has also been instructed to follow up on as needed basis.     Future Appointments   Date Time Provider Department Center   3/23/2023  2:30 PM MIGUEL Dominique FNP-C

## 2023-04-20 ENCOUNTER — OFFICE VISIT (OUTPATIENT)
Dept: FAMILY MEDICINE | Facility: CLINIC | Age: 63
End: 2023-04-20
Payer: COMMERCIAL

## 2023-04-20 VITALS
TEMPERATURE: 98 F | BODY MASS INDEX: 39.65 KG/M2 | WEIGHT: 252.63 LBS | OXYGEN SATURATION: 97 % | DIASTOLIC BLOOD PRESSURE: 86 MMHG | HEIGHT: 67 IN | SYSTOLIC BLOOD PRESSURE: 132 MMHG | HEART RATE: 76 BPM

## 2023-04-20 DIAGNOSIS — E11.59 TYPE 2 DIABETES MELLITUS WITH OTHER CIRCULATORY COMPLICATION, WITHOUT LONG-TERM CURRENT USE OF INSULIN: Primary | ICD-10-CM

## 2023-04-20 DIAGNOSIS — I48.20 CHRONIC ATRIAL FIBRILLATION: ICD-10-CM

## 2023-04-20 DIAGNOSIS — I87.2 PERIPHERAL VENOUS INSUFFICIENCY: ICD-10-CM

## 2023-04-20 DIAGNOSIS — E66.9 OBESITY, UNSPECIFIED CLASSIFICATION, UNSPECIFIED OBESITY TYPE, UNSPECIFIED WHETHER SERIOUS COMORBIDITY PRESENT: ICD-10-CM

## 2023-04-20 DIAGNOSIS — F32.9 MAJOR DEPRESSIVE DISORDER, REMISSION STATUS UNSPECIFIED, UNSPECIFIED WHETHER RECURRENT: ICD-10-CM

## 2023-04-20 DIAGNOSIS — I25.10 CORONARY ARTERY DISEASE, UNSPECIFIED VESSEL OR LESION TYPE, UNSPECIFIED WHETHER ANGINA PRESENT, UNSPECIFIED WHETHER NATIVE OR TRANSPLANTED HEART: ICD-10-CM

## 2023-04-20 DIAGNOSIS — K59.00 CONSTIPATION, UNSPECIFIED CONSTIPATION TYPE: ICD-10-CM

## 2023-04-20 DIAGNOSIS — F41.1 GAD (GENERALIZED ANXIETY DISORDER): ICD-10-CM

## 2023-04-20 PROCEDURE — 1159F MED LIST DOCD IN RCRD: CPT | Mod: ,,, | Performed by: NURSE PRACTITIONER

## 2023-04-20 PROCEDURE — 3008F BODY MASS INDEX DOCD: CPT | Mod: ,,, | Performed by: NURSE PRACTITIONER

## 2023-04-20 PROCEDURE — 3079F PR MOST RECENT DIASTOLIC BLOOD PRESSURE 80-89 MM HG: ICD-10-PCS | Mod: ,,, | Performed by: NURSE PRACTITIONER

## 2023-04-20 PROCEDURE — 1160F RVW MEDS BY RX/DR IN RCRD: CPT | Mod: ,,, | Performed by: NURSE PRACTITIONER

## 2023-04-20 PROCEDURE — 99214 OFFICE O/P EST MOD 30 MIN: CPT | Mod: ,,, | Performed by: NURSE PRACTITIONER

## 2023-04-20 PROCEDURE — 1160F PR REVIEW ALL MEDS BY PRESCRIBER/CLIN PHARMACIST DOCUMENTED: ICD-10-PCS | Mod: ,,, | Performed by: NURSE PRACTITIONER

## 2023-04-20 PROCEDURE — 3075F PR MOST RECENT SYSTOLIC BLOOD PRESS GE 130-139MM HG: ICD-10-PCS | Mod: ,,, | Performed by: NURSE PRACTITIONER

## 2023-04-20 PROCEDURE — 3079F DIAST BP 80-89 MM HG: CPT | Mod: ,,, | Performed by: NURSE PRACTITIONER

## 2023-04-20 PROCEDURE — 1159F PR MEDICATION LIST DOCUMENTED IN MEDICAL RECORD: ICD-10-PCS | Mod: ,,, | Performed by: NURSE PRACTITIONER

## 2023-04-20 PROCEDURE — 3075F SYST BP GE 130 - 139MM HG: CPT | Mod: ,,, | Performed by: NURSE PRACTITIONER

## 2023-04-20 PROCEDURE — 4010F ACE/ARB THERAPY RXD/TAKEN: CPT | Mod: ,,, | Performed by: NURSE PRACTITIONER

## 2023-04-20 PROCEDURE — 3008F PR BODY MASS INDEX (BMI) DOCUMENTED: ICD-10-PCS | Mod: ,,, | Performed by: NURSE PRACTITIONER

## 2023-04-20 PROCEDURE — 99214 PR OFFICE/OUTPT VISIT, EST, LEVL IV, 30-39 MIN: ICD-10-PCS | Mod: ,,, | Performed by: NURSE PRACTITIONER

## 2023-04-20 PROCEDURE — 4010F PR ACE/ARB THEARPY RXD/TAKEN: ICD-10-PCS | Mod: ,,, | Performed by: NURSE PRACTITIONER

## 2023-04-20 RX ORDER — SEMAGLUTIDE 1.34 MG/ML
INJECTION, SOLUTION SUBCUTANEOUS
Qty: 6 EACH | Refills: 2 | Status: SHIPPED | OUTPATIENT
Start: 2023-04-20 | End: 2023-06-20 | Stop reason: SDUPTHER

## 2023-04-20 RX ORDER — METFORMIN HYDROCHLORIDE 500 MG/1
500 TABLET, EXTENDED RELEASE ORAL NIGHTLY
Qty: 90 TABLET | Refills: 3 | Status: SHIPPED | OUTPATIENT
Start: 2023-04-20 | End: 2023-08-08

## 2023-04-20 RX ORDER — ASCORBIC ACID 500 MG
2 TABLET ORAL NIGHTLY
COMMUNITY

## 2023-04-20 NOTE — PROGRESS NOTES
Patient ID: [id number]     Chief Complaint: Follow-up (F/U on medication for anxiety and depression)      HPI:     Lakesha Post is a 62 y.o. female here today for a follow up.  She's been taking the Cymbalta consistently since her last visit.  She does note that it helps her be a little more relaxed.  She denies symptoms of depression.  She reports weight gain over the last several years and unable to lose it despite minimal intake.  She's been checking her blood sugar most mornings fasting and reports usually 120, highest reading 170.  She doesn't recall why she isn't on metformin.   She reports feeling bloated and like she has gained all the weight in her abdomen.  She does have issues with constipation.  She recently bought some over-the-counter stool softeners which she has not had tried but plans to do.      Past Medical History:  has a past medical history of Anxiety disorder, unspecified, Asthma, CAD (coronary artery disease), CHF (congestive heart failure), Chronic atrial fibrillation, Chronic pain, Chronic venous insufficiency, Diabetes mellitus, type 2, DVT, lower extremity, Hyperlipidemia, Hypertension, Left ventricular systolic dysfunction, Low back pain, MDD (major depressive disorder), and Mild mitral valve regurgitation.    Surgical History:  has a past surgical history that includes Hysterectomy.    Family History: family history includes Brain cancer in her brother; Diabetes type II in her brother; Heart disease in her mother; Kidney failure in her brother.    Social History:  reports that she quit smoking about 5 months ago. Her smoking use included cigarettes. She has never used smokeless tobacco. She reports current alcohol use. She reports that she does not use drugs.    Current Outpatient Medications   Medication Instructions    acetaminophen (TYLENOL) 1,300 mg, Oral, Every 8 hours PRN    albuterol (PROVENTIL/VENTOLIN HFA) 90 mcg/actuation inhaler 2 puffs, Inhalation, Every 4 hours PRN     "apixaban (ELIQUIS) 5 mg, Oral, 2 times daily    aspirin (ECOTRIN) 81 mg, Oral, Daily    atorvastatin (LIPITOR) 40 mg, Oral, Nightly    blood sugar diagnostic (TRUE METRIX GLUCOSE TEST STRIP) Strp 1 strip, Misc.(Non-Drug; Combo Route), 2 times daily    buPROPion (WELLBUTRIN) 100 mg, Oral, Daily    carvediloL (COREG) 25 mg, Oral, Take 1 and 1/2 tablets daily    DULoxetine (CYMBALTA) 30 mg, Oral, Daily    ibuprofen-diphenhydrAMINE cit (IBUPROFEN PM) 200-38 mg Tab 2 tablets, Oral, Nightly    isosorbide mononitrate (IMDUR) 30 mg, Oral, Daily    lancets 18 gauge Misc 1 lancet, Misc.(Non-Drug; Combo Route), 2 times daily    metFORMIN (GLUCOPHAGE-XR) 500 mg, Oral, Nightly    sacubitriL-valsartan (ENTRESTO)  mg per tablet 1 tablet, Oral, 2 times daily    semaglutide (OZEMPIC) 0.25 mg or 0.5 mg(2 mg/1.5 mL) pen injector Inject 0.25 mg into the skin every 7 days for 30 days, THEN 0.5 mg every 7 days. Start with 0.25 mg dose once weekly x 1 month, then increase to 0.5 mg dose once weekly thereafter.       Patient has No Known Allergies.     Patient Care Team:  MIGUEL Dominique as PCP - General (Family Medicine)  Sahil Farr MD as Consulting Physician (Cardiology)  Kei Lantigua OD as Consulting Physician (Optometry)       Subjective:     Review of Systems    See HPI     Objective:     Visit Vitals  /86 (BP Location: Left arm, Patient Position: Sitting, BP Method: Medium (Manual))   Pulse 76   Temp 98.2 °F (36.8 °C) (Temporal)   Ht 5' 6.93" (1.7 m)   Wt 114.6 kg (252 lb 10.4 oz)   SpO2 97%   BMI 39.65 kg/m²       Physical Exam  Constitutional:       Appearance: She is obese.   HENT:      Head: Normocephalic and atraumatic.      Mouth/Throat:      Mouth: Mucous membranes are moist.   Cardiovascular:      Rate and Rhythm: Normal rate. Rhythm irregular.      Heart sounds: Murmur (at the apex) heard.   Pulmonary:      Effort: Pulmonary effort is normal.      Breath sounds: Normal breath sounds. "   Musculoskeletal:      Cervical back: Normal range of motion and neck supple.   Skin:     General: Skin is warm and dry.      Capillary Refill: Capillary refill takes less than 2 seconds.   Neurological:      General: No focal deficit present.      Mental Status: She is alert. Mental status is at baseline.   Psychiatric:         Attention and Perception: Attention normal.         Mood and Affect: Mood is anxious. Mood is not depressed.         Speech: Speech normal.         Behavior: Behavior normal. Behavior is cooperative.     Assessment:       ICD-10-CM ICD-9-CM   1. Type 2 diabetes mellitus with other circulatory complication, without long-term current use of insulin  E11.59 250.70   2. Coronary artery disease, unspecified vessel or lesion type, unspecified whether angina present, unspecified whether native or transplanted heart  I25.10 414.00   3. Peripheral venous insufficiency  I87.2 459.81   4. Chronic atrial fibrillation  I48.20 427.31   5. SHAVON (generalized anxiety disorder)  F41.1 300.02   6. Major depressive disorder, remission status unspecified, unspecified whether recurrent  F32.9 296.20   7. Obesity, unspecified classification, unspecified obesity type, unspecified whether serious comorbidity present  E66.9 278.00   8. Constipation, unspecified constipation type  K59.00 564.00        Plan:     Encouraged use of stool softeners  Start metformin and Ozempic.  Counseled on titration as tolerated  She was also advised that she could come into the office and we could show her how to give herself the 1st injection.  Encouraged low carbohydrate diet      Follow up in about 3 months (around 7/20/2023) for Follow Up, Labs Prior. In addition to their scheduled follow up, the patient has also been instructed to follow up on as needed basis.     Future Appointments   Date Time Provider Department Center   7/13/2023  8:50 AM [facility]   7/19/2023  8:30 AM Layne MATAMOROS  MIGUEL Daniel FNP-C

## 2023-05-09 ENCOUNTER — TELEPHONE (OUTPATIENT)
Dept: FAMILY MEDICINE | Facility: CLINIC | Age: 63
End: 2023-05-09
Payer: COMMERCIAL

## 2023-05-09 DIAGNOSIS — I10 HYPERTENSION, UNSPECIFIED TYPE: Primary | ICD-10-CM

## 2023-05-09 DIAGNOSIS — I48.20 CHRONIC ATRIAL FIBRILLATION: ICD-10-CM

## 2023-05-09 DIAGNOSIS — Z86.718 HISTORY OF DEEP VENOUS THROMBOSIS: ICD-10-CM

## 2023-05-09 RX ORDER — SACUBITRIL AND VALSARTAN 97; 103 MG/1; MG/1
1 TABLET, FILM COATED ORAL 2 TIMES DAILY
Qty: 90 TABLET | Refills: 0 | Status: SHIPPED | OUTPATIENT
Start: 2023-05-09 | End: 2024-02-19 | Stop reason: SDUPTHER

## 2023-05-09 RX ORDER — ISOSORBIDE MONONITRATE 30 MG/1
30 TABLET, EXTENDED RELEASE ORAL DAILY
Qty: 90 TABLET | Refills: 0 | Status: SHIPPED | OUTPATIENT
Start: 2023-05-09

## 2023-05-09 NOTE — TELEPHONE ENCOUNTER
----- Message from Nura Pritchett sent at 5/9/2023  4:03 PM CDT -----  Regarding: refill  Eliquis, entresto, isosorbide mononitrate    [facility] [place]    [phone]

## 2023-05-15 ENCOUNTER — TELEPHONE (OUTPATIENT)
Dept: FAMILY MEDICINE | Facility: CLINIC | Age: 63
End: 2023-05-15
Payer: COMMERCIAL

## 2023-05-15 NOTE — TELEPHONE ENCOUNTER
----- Message from Zari Zurita MA sent at 5/15/2023 10:46 AM CDT -----  Regarding: Diabetic Eye Exam due  Our records indicate the patient is due for a diabetic eye exam. Please find out if the patient has had one done in the last year so we can obtain those records. If not, please send a referral to an ophthalmologist of their choice.

## 2023-06-20 ENCOUNTER — TELEPHONE (OUTPATIENT)
Dept: FAMILY MEDICINE | Facility: CLINIC | Age: 63
End: 2023-06-20
Payer: COMMERCIAL

## 2023-06-20 DIAGNOSIS — F41.1 GAD (GENERALIZED ANXIETY DISORDER): Primary | ICD-10-CM

## 2023-06-20 DIAGNOSIS — E11.59 TYPE 2 DIABETES MELLITUS WITH OTHER CIRCULATORY COMPLICATION, WITHOUT LONG-TERM CURRENT USE OF INSULIN: ICD-10-CM

## 2023-06-20 RX ORDER — SEMAGLUTIDE 1.34 MG/ML
INJECTION, SOLUTION SUBCUTANEOUS
Qty: 6 EACH | Refills: 2 | Status: SHIPPED | OUTPATIENT
Start: 2023-06-20 | End: 2023-08-08 | Stop reason: DRUGHIGH

## 2023-06-20 RX ORDER — DULOXETIN HYDROCHLORIDE 30 MG/1
30 CAPSULE, DELAYED RELEASE ORAL DAILY
Qty: 90 CAPSULE | Refills: 0 | Status: SHIPPED | OUTPATIENT
Start: 2023-06-20 | End: 2023-06-27

## 2023-06-26 DIAGNOSIS — F41.1 GAD (GENERALIZED ANXIETY DISORDER): ICD-10-CM

## 2023-06-27 RX ORDER — BUPROPION HYDROCHLORIDE 100 MG/1
TABLET ORAL
Qty: 90 TABLET | Refills: 0 | Status: SHIPPED | OUTPATIENT
Start: 2023-06-27 | End: 2023-07-31

## 2023-06-27 RX ORDER — DULOXETIN HYDROCHLORIDE 30 MG/1
CAPSULE, DELAYED RELEASE ORAL
Qty: 90 CAPSULE | Refills: 0 | Status: SHIPPED | OUTPATIENT
Start: 2023-06-27 | End: 2023-07-31

## 2023-07-13 PROCEDURE — 86803 HEPATITIS C AB TEST: CPT | Performed by: NURSE PRACTITIONER

## 2023-07-13 PROCEDURE — 80053 COMPREHEN METABOLIC PANEL: CPT | Performed by: NURSE PRACTITIONER

## 2023-07-13 PROCEDURE — 80061 LIPID PANEL: CPT | Performed by: NURSE PRACTITIONER

## 2023-07-13 PROCEDURE — 83036 HEMOGLOBIN GLYCOSYLATED A1C: CPT | Performed by: NURSE PRACTITIONER

## 2023-07-13 PROCEDURE — 85025 COMPLETE CBC W/AUTO DIFF WBC: CPT | Performed by: NURSE PRACTITIONER

## 2023-07-13 PROCEDURE — 84443 ASSAY THYROID STIM HORMONE: CPT | Performed by: NURSE PRACTITIONER

## 2023-07-19 ENCOUNTER — TELEPHONE (OUTPATIENT)
Dept: FAMILY MEDICINE | Facility: CLINIC | Age: 63
End: 2023-07-19

## 2023-07-19 DIAGNOSIS — I10 PRIMARY HYPERTENSION: Primary | ICD-10-CM

## 2023-07-19 RX ORDER — CARVEDILOL 25 MG/1
25 TABLET ORAL 2 TIMES DAILY
Qty: 60 TABLET | Refills: 1 | Status: SHIPPED | OUTPATIENT
Start: 2023-07-19

## 2023-07-19 RX ORDER — CARVEDILOL 25 MG/1
25 TABLET ORAL
Status: CANCELLED | OUTPATIENT
Start: 2023-07-19

## 2023-07-19 NOTE — TELEPHONE ENCOUNTER
----- Message from Liz Cisneros sent at 7/19/2023  2:53 PM CDT -----  Regarding: refill  carvediloL (COREG) 25 MG tablet- twice a day       [facility]       [phone]

## 2023-07-29 DIAGNOSIS — F41.1 GAD (GENERALIZED ANXIETY DISORDER): ICD-10-CM

## 2023-07-31 RX ORDER — DULOXETIN HYDROCHLORIDE 30 MG/1
CAPSULE, DELAYED RELEASE ORAL
Qty: 90 CAPSULE | Refills: 0 | Status: SHIPPED | OUTPATIENT
Start: 2023-07-31 | End: 2023-10-30

## 2023-07-31 RX ORDER — BUPROPION HYDROCHLORIDE 100 MG/1
100 TABLET ORAL
Qty: 90 TABLET | Refills: 0 | Status: SHIPPED | OUTPATIENT
Start: 2023-07-31 | End: 2023-10-30

## 2023-07-31 RX ORDER — BUPROPION HYDROCHLORIDE 100 MG/1
TABLET ORAL
Qty: 90 TABLET | Refills: 0 | Status: SHIPPED | OUTPATIENT
Start: 2023-07-31 | End: 2023-08-08

## 2023-08-08 ENCOUNTER — OFFICE VISIT (OUTPATIENT)
Dept: FAMILY MEDICINE | Facility: CLINIC | Age: 63
End: 2023-08-08
Payer: COMMERCIAL

## 2023-08-08 ENCOUNTER — NURSE TRIAGE (OUTPATIENT)
Dept: ADMINISTRATIVE | Facility: CLINIC | Age: 63
End: 2023-08-08
Payer: COMMERCIAL

## 2023-08-08 VITALS
WEIGHT: 245.81 LBS | BODY MASS INDEX: 38.58 KG/M2 | SYSTOLIC BLOOD PRESSURE: 128 MMHG | OXYGEN SATURATION: 96 % | HEART RATE: 70 BPM | HEIGHT: 67 IN | TEMPERATURE: 98 F | DIASTOLIC BLOOD PRESSURE: 80 MMHG

## 2023-08-08 DIAGNOSIS — E11.59 TYPE 2 DIABETES MELLITUS WITH OTHER CIRCULATORY COMPLICATION, WITHOUT LONG-TERM CURRENT USE OF INSULIN: Primary | ICD-10-CM

## 2023-08-08 DIAGNOSIS — Z12.31 SCREENING MAMMOGRAM FOR BREAST CANCER: ICD-10-CM

## 2023-08-08 DIAGNOSIS — I10 PRIMARY HYPERTENSION: ICD-10-CM

## 2023-08-08 DIAGNOSIS — E66.9 OBESITY, UNSPECIFIED CLASSIFICATION, UNSPECIFIED OBESITY TYPE, UNSPECIFIED WHETHER SERIOUS COMORBIDITY PRESENT: ICD-10-CM

## 2023-08-08 DIAGNOSIS — Z91.89 AT RISK FOR KNOWLEDGE DEFICIT: ICD-10-CM

## 2023-08-08 PROCEDURE — 1160F PR REVIEW ALL MEDS BY PRESCRIBER/CLIN PHARMACIST DOCUMENTED: ICD-10-PCS | Mod: ,,, | Performed by: NURSE PRACTITIONER

## 2023-08-08 PROCEDURE — 3044F PR MOST RECENT HEMOGLOBIN A1C LEVEL <7.0%: ICD-10-PCS | Mod: ,,, | Performed by: NURSE PRACTITIONER

## 2023-08-08 PROCEDURE — 3074F PR MOST RECENT SYSTOLIC BLOOD PRESSURE < 130 MM HG: ICD-10-PCS | Mod: ,,, | Performed by: NURSE PRACTITIONER

## 2023-08-08 PROCEDURE — 3044F HG A1C LEVEL LT 7.0%: CPT | Mod: ,,, | Performed by: NURSE PRACTITIONER

## 2023-08-08 PROCEDURE — 3074F SYST BP LT 130 MM HG: CPT | Mod: ,,, | Performed by: NURSE PRACTITIONER

## 2023-08-08 PROCEDURE — 99214 PR OFFICE/OUTPT VISIT, EST, LEVL IV, 30-39 MIN: ICD-10-PCS | Mod: ,,, | Performed by: NURSE PRACTITIONER

## 2023-08-08 PROCEDURE — 3008F BODY MASS INDEX DOCD: CPT | Mod: ,,, | Performed by: NURSE PRACTITIONER

## 2023-08-08 PROCEDURE — 1159F PR MEDICATION LIST DOCUMENTED IN MEDICAL RECORD: ICD-10-PCS | Mod: ,,, | Performed by: NURSE PRACTITIONER

## 2023-08-08 PROCEDURE — 1159F MED LIST DOCD IN RCRD: CPT | Mod: ,,, | Performed by: NURSE PRACTITIONER

## 2023-08-08 PROCEDURE — 3079F PR MOST RECENT DIASTOLIC BLOOD PRESSURE 80-89 MM HG: ICD-10-PCS | Mod: ,,, | Performed by: NURSE PRACTITIONER

## 2023-08-08 PROCEDURE — 3008F PR BODY MASS INDEX (BMI) DOCUMENTED: ICD-10-PCS | Mod: ,,, | Performed by: NURSE PRACTITIONER

## 2023-08-08 PROCEDURE — 4010F ACE/ARB THERAPY RXD/TAKEN: CPT | Mod: ,,, | Performed by: NURSE PRACTITIONER

## 2023-08-08 PROCEDURE — 4010F PR ACE/ARB THEARPY RXD/TAKEN: ICD-10-PCS | Mod: ,,, | Performed by: NURSE PRACTITIONER

## 2023-08-08 PROCEDURE — 3079F DIAST BP 80-89 MM HG: CPT | Mod: ,,, | Performed by: NURSE PRACTITIONER

## 2023-08-08 PROCEDURE — 99214 OFFICE O/P EST MOD 30 MIN: CPT | Mod: ,,, | Performed by: NURSE PRACTITIONER

## 2023-08-08 PROCEDURE — 1160F RVW MEDS BY RX/DR IN RCRD: CPT | Mod: ,,, | Performed by: NURSE PRACTITIONER

## 2023-08-08 RX ORDER — CARVEDILOL 25 MG/1
25 TABLET ORAL 2 TIMES DAILY
Qty: 60 TABLET | Refills: 1 | Status: CANCELLED | OUTPATIENT
Start: 2023-08-08

## 2023-08-08 RX ORDER — SEMAGLUTIDE 1.34 MG/ML
1 INJECTION, SOLUTION SUBCUTANEOUS
Qty: 9 ML | Refills: 3 | Status: SHIPPED | OUTPATIENT
Start: 2023-08-08

## 2023-08-08 NOTE — PROGRESS NOTES
Patient ID: [id number]     Chief Complaint: Diabetes (With labs )      HPI:     Lakesha Post is a 62 y.o. female here today for diabetic  follow up.  Lab results reviewed and discussed with her.     Past Medical History:  has a past medical history of Anxiety disorder, unspecified, Asthma, CAD (coronary artery disease), CHF (congestive heart failure), Chronic atrial fibrillation, Chronic pain, Chronic venous insufficiency, Diabetes mellitus, type 2, DVT, lower extremity, Hyperlipidemia, Hypertension, Left ventricular systolic dysfunction, Low back pain, MDD (major depressive disorder), and Mild mitral valve regurgitation.    Surgical History:  has a past surgical history that includes Hysterectomy and Eye surgery (2021).    Family History: family history includes Alcohol abuse in her brother; Brain cancer in her brother; Diabetes type II in her brother; Heart disease in her mother; Kidney failure in her brother.    Social History:  reports that she quit smoking about 9 months ago. Her smoking use included cigarettes. She has never used smokeless tobacco. She reports current alcohol use of about 4.0 standard drinks of alcohol per week. She reports that she does not currently use drugs.    Current Outpatient Medications   Medication Instructions    acetaminophen (TYLENOL) 1,300 mg, Oral, Every 8 hours PRN    albuterol (PROVENTIL/VENTOLIN HFA) 90 mcg/actuation inhaler 2 puffs, Inhalation, Every 4 hours PRN    apixaban (ELIQUIS) 5 mg, Oral, 2 times daily    aspirin (ECOTRIN) 81 mg, Oral, Daily    atorvastatin (LIPITOR) 40 mg, Oral, Nightly    blood sugar diagnostic (TRUE METRIX GLUCOSE TEST STRIP) Strp 1 strip, Misc.(Non-Drug; Combo Route), 2 times daily    buPROPion (WELLBUTRIN) 100 mg, Oral    carvediloL (COREG) 25 mg, Oral, 2 times daily    DULoxetine (CYMBALTA) 30 MG capsule TAKE 1 CAPSULE ONCE DAILY    ibuprofen-diphenhydrAMINE cit (IBUPROFEN PM) 200-38 mg Tab 2 tablets, Oral, Nightly    isosorbide mononitrate  "(IMDUR) 30 mg, Oral, Daily    lancets 18 gauge Misc 1 lancet , Misc.(Non-Drug; Combo Route), 2 times daily    metFORMIN (GLUCOPHAGE-XR) 500 mg, Oral, Nightly    sacubitriL-valsartan (ENTRESTO)  mg per tablet 1 tablet, Oral, 2 times daily    semaglutide (OZEMPIC) 0.25 mg or 0.5 mg(2 mg/1.5 mL) pen injector Inject 0.25 mg into the skin every 7 days for 30 days, THEN 0.5 mg every 7 days. Start with 0.25 mg dose once weekly x 1 month, then increase to 0.5 mg dose once weekly thereafter.       Patient has No Known Allergies.     Patient Care Team:  Layne Daniel FNP-C as PCP - General (Family Medicine)  Sahil Farr MD as Consulting Physician (Cardiology)  Kei Lantigua OD as Consulting Physician (Optometry)       Subjective:     Review of Systems    See HPI     Objective:     Visit Vitals  /80 (BP Location: Left arm)   Pulse 70   Temp 98.1 °F (36.7 °C) (Temporal)   Ht 5' 6.93" (1.7 m)   Wt 111.5 kg (245 lb 12.8 oz)   SpO2 96%   BMI 38.58 kg/m²       Physical Exam      Labs Reviewed:     Chemistry:  Lab Results   Component Value Date     07/13/2023    K 4.6 07/13/2023    CHLORIDE 105 07/13/2023    BUN 11.0 07/13/2023    CREATININE 0.85 07/13/2023    EGFRNORACEVR 77 07/13/2023    GLUCOSE 121 (H) 07/13/2023    CALCIUM 9.5 07/13/2023    ALKPHOS 93 07/13/2023    LABPROT 7.1 07/13/2023    ALBUMIN 4.2 07/13/2023    AST 24 07/13/2023    ALT 22 07/13/2023    TSH 2.620 07/13/2023        Lab Results   Component Value Date    HGBA1C 6.6 (H) 07/13/2023        Hematology:  Lab Results   Component Value Date    WBC 5.53 07/13/2023    RBC 4.56 07/13/2023    HGB 12.2 07/13/2023    HCT 36.0 07/13/2023    MCV 78.9 (L) 07/13/2023    MCH 26.8 (L) 07/13/2023    MCHC 33.9 07/13/2023    RDW 15.1 (H) 07/13/2023     07/13/2023    MPV 11.5 07/13/2023       Lipid Panel:  Lab Results   Component Value Date    CHOL 136 07/13/2023    HDL 51 07/13/2023    LDL 65.5 03/25/2022    TRIG 94 07/13/2023    "         Assessment:       ICD-10-CM ICD-9-CM   1. Primary hypertension  I10 401.9        Plan:     1. Primary hypertension  ***       No orders of the defined types were placed in this encounter.             Medications Discontinued During This Encounter   Medication Reason    buPROPion (WELLBUTRIN) 100 MG tablet         No follow-ups on file. In addition to their scheduled follow up, the patient has also been instructed to follow up on as needed basis.     No future appointments.     Layne Daniel, LARON-C

## 2023-08-08 NOTE — PROGRESS NOTES
Patient ID: [id number]     Chief Complaint: Diabetes (With labs )      HPI:     Lakesha Post is a 62 y.o. female here today for lab results and follow up on diabetes.  Those results are reviewed and discussed with her. She reports recent appointment with cardiology and no changes.  She states that she feels well today.  Has been out of Ozempic 1 week. She called pharmacy but doesn't understand why her medicine hasn't come.  She ran out of coreg but found old bottle in the cabinet, so she started taking that.        Past Medical History:  has a past medical history of Anxiety disorder, unspecified, Asthma, CAD (coronary artery disease), CHF (congestive heart failure), Chronic atrial fibrillation, Chronic pain, Chronic venous insufficiency, Diabetes mellitus, type 2, DVT, lower extremity, Hyperlipidemia, Hypertension, Left ventricular systolic dysfunction, Low back pain, MDD (major depressive disorder), and Mild mitral valve regurgitation.    Surgical History:  has a past surgical history that includes Hysterectomy and Eye surgery (2021).    Family History: family history includes Alcohol abuse in her brother; Brain cancer in her brother; Diabetes type II in her brother; Heart disease in her mother; Kidney failure in her brother.    Social History:  reports that she quit smoking about 9 months ago. Her smoking use included cigarettes. She has never used smokeless tobacco. She reports current alcohol use of about 4.0 standard drinks of alcohol per week. She reports that she does not currently use drugs.    Current Outpatient Medications   Medication Instructions    acetaminophen (TYLENOL) 1,300 mg, Oral, Every 8 hours PRN    albuterol (PROVENTIL/VENTOLIN HFA) 90 mcg/actuation inhaler 2 puffs, Inhalation, Every 4 hours PRN    apixaban (ELIQUIS) 5 mg, Oral, 2 times daily    aspirin (ECOTRIN) 81 mg, Oral, Daily    atorvastatin (LIPITOR) 40 mg, Oral, Nightly    blood sugar diagnostic (TRUE METRIX GLUCOSE TEST STRIP) Strp 1  "strip, Misc.(Non-Drug; Combo Route), 2 times daily    buPROPion (WELLBUTRIN) 100 mg, Oral    carvediloL (COREG) 25 mg, Oral, 2 times daily    DULoxetine (CYMBALTA) 30 MG capsule TAKE 1 CAPSULE ONCE DAILY    ibuprofen-diphenhydrAMINE cit (IBUPROFEN PM) 200-38 mg Tab 2 tablets, Oral, Nightly    isosorbide mononitrate (IMDUR) 30 mg, Oral, Daily    lancets 18 gauge Misc 1 lancet , Misc.(Non-Drug; Combo Route), 2 times daily    metFORMIN (GLUCOPHAGE-XR) 500 mg, Oral, Nightly    OZEMPIC 1 mg, Subcutaneous, Every 7 days    sacubitriL-valsartan (ENTRESTO)  mg per tablet 1 tablet, Oral, 2 times daily       Patient has No Known Allergies.     Patient Care Team:  Layne Daniel FNP-C as PCP - General (Family Medicine)  Sahil Farr MD as Consulting Physician (Cardiology)  Kei Lantigua OD as Consulting Physician (Optometry)       Subjective:     Review of Systems    See HPI     Objective:     Visit Vitals  /80 (BP Location: Left arm)   Pulse 70   Temp 98.1 °F (36.7 °C) (Temporal)   Ht 5' 6.93" (1.7 m)   Wt 111.5 kg (245 lb 12.8 oz)   SpO2 96%   BMI 38.58 kg/m²       Physical Exam  Vitals reviewed.   Constitutional:       Appearance: Normal appearance.   Cardiovascular:      Rate and Rhythm: Normal rate and regular rhythm.      Heart sounds: Normal heart sounds.   Pulmonary:      Effort: Pulmonary effort is normal.      Breath sounds: Normal breath sounds.   Skin:     General: Skin is warm and dry.   Neurological:      Mental Status: She is alert and oriented to person, place, and time.   Psychiatric:         Mood and Affect: Mood normal.         Behavior: Behavior normal.         Thought Content: Thought content normal.         Judgment: Judgment normal.       Labs Reviewed:     Chemistry:  Lab Results   Component Value Date     07/13/2023    K 4.6 07/13/2023    CHLORIDE 105 07/13/2023    BUN 11.0 07/13/2023    CREATININE 0.85 07/13/2023    EGFRNORACEVR 77 07/13/2023    GLUCOSE 121 (H) " 07/13/2023    CALCIUM 9.5 07/13/2023    ALKPHOS 93 07/13/2023    LABPROT 7.1 07/13/2023    ALBUMIN 4.2 07/13/2023    AST 24 07/13/2023    ALT 22 07/13/2023    TSH 2.620 07/13/2023        Lab Results   Component Value Date    HGBA1C 6.6 (H) 07/13/2023        Hematology:  Lab Results   Component Value Date    WBC 5.53 07/13/2023    RBC 4.56 07/13/2023    HGB 12.2 07/13/2023    HCT 36.0 07/13/2023    MCV 78.9 (L) 07/13/2023    MCH 26.8 (L) 07/13/2023    MCHC 33.9 07/13/2023    RDW 15.1 (H) 07/13/2023     07/13/2023    MPV 11.5 07/13/2023     Lipid Panel:  Lab Results   Component Value Date    CHOL 136 07/13/2023    HDL 51 07/13/2023    LDL 65.5 03/25/2022    TRIG 94 07/13/2023        Assessment:       ICD-10-CM ICD-9-CM   1. Type 2 diabetes mellitus with other circulatory complication, without long-term current use of insulin  E11.59 250.70   2. Primary hypertension  I10 401.9   3. Screening mammogram for breast cancer  Z12.31 V76.12        Plan:     Increase Ozempic to 1 mg weekly since not at goal.  Mammogram ordered  Eye exam scheduled for later this month.    Advised to call cardiology for refills on Coreg.     Follow up in about 3 months (around 11/8/2023) for Wellness, Labs Prior. In addition to their scheduled follow up, the patient has also been instructed to follow up on as needed basis.

## 2023-08-08 NOTE — TELEPHONE ENCOUNTER
Call place to pt times 4 no answer. VM left and message left with sister. Encounter routed to provider.   Reason for Disposition   Message left on unidentified voice mail. Phone number verified.    Protocols used: No Contact or Duplicate Contact Call-A-OH

## 2023-08-21 ENCOUNTER — DOCUMENTATION ONLY (OUTPATIENT)
Dept: ADMINISTRATIVE | Facility: HOSPITAL | Age: 63
End: 2023-08-21
Payer: COMMERCIAL

## 2023-08-21 LAB
LEFT EYE DM RETINOPATHY: NEGATIVE
RIGHT EYE DM RETINOPATHY: NEGATIVE

## 2023-08-21 NOTE — PROGRESS NOTES
Population Health Chart Review & Patient Outreach Details:     Reason for Outreach Encounter:     [x]  Non-Compliant Report   []  Payor Report ([employer], PHN, BCBS, MSSP, MCIP, C, etc.)   []  Pre-Visit Chart Review    Patient Outreach Method:    []  Telephone Outreach Completed   [] Successful   [] Left Voicemail   [] Unable to Contact (wrong number, no voicemail)  []  [facility] Outreach Sent  []  Letter Outreach Mailed  []  Fax Sent for External Records  [x]  External Records Upload    Health Maintenance Topics Addressed and Outreach Outcomes / Actions Taken:        []      Breast Cancer Screening []  Mammo Scheduled      []  External Records Requested     []  Added Reminder to Complete to Upcoming Primary Care Appt Notes     []  Patient Declined     []  Patient Will Call Back to Schedule     []  Patient Will Schedule with External Provider / Order Routed if Applicable             []       Cervical Cancer Screening []  Pap Scheduled      []  External Records Requested     []  Added Reminder to Complete to Upcoming Primary Care Appt Notes     []  Patient Declined     []  Patient Will Call Back to Schedule     []  Patient Will Schedule with External Provider               []          Colorectal Cancer Screening []  Colonoscopy Case Request or Referral Placed     []  External Records Requested     []  Added Reminder to Complete to Upcoming Primary Care Appt Notes     []  Patient Declined     []  Patient Will Call Back to Schedule     []  Patient Will Schedule with External Provider     []  Fit Kit Mailed (add the SmartPhrase under additional notes)     []  Reminded Patient to Complete Home Test             [x]      Diabetic Eye Exam []  Eye Camera Scheduled or Optometry Referral Placed     [x]  External Records Hyperlinked     []  Added Reminder to Complete to Upcoming Primary Care Appt Notes     []  Patient Declined     []  Patient Will Call Back to Schedule     []  Patient Will Schedule with External Provider              []      Blood Pressure Control []  Primary Care Follow Up Visit Scheduled     []  Remote Blood Pressure Reading Captured     []  Added Reminder to Complete to Upcoming Primary Care Appt Notes     []  Patient Declined     []  Patient Will Call Back / Patient Will Send Portal Message with Reading     []  Patient Will Call Back to Schedule Provider Visit             []       HbA1c & Other Labs []  Lab Appt Scheduled for Due Labs     []  Primary Care Follow Up Visit Scheduled      []  Reminded Patient to Complete Home Test     []  Added Reminder to Complete to Upcoming Primary Care Appt Notes     []  Patient Declined     []  Patient Will Call Back to Schedule     []  Patient Will Schedule with External Provider / Order Routed if Applicable           []    Schedule Primary Care Appt []  Primary Care Appt Scheduled     []  Patient Declined     []  Patient Will Call Back to Schedule     []  Pt Established with External Provider & Updated Care Team             []      Medication Adherence []  Primary Care Appointment Scheduled     []  Added Reminder to Upcoming Primary Care Appt Notes     []  Patient Reminded to  Prescription     []  Patient Declined, Provider Notified if Needed     []  Sent Provider Message to Review and/or Add Exclusion to Problem List             []      Osteoporosis Screening []  DXA Appointment Scheduled     []  External Records Requested     []  Added Reminder to Complete to Upcoming Primary Care Appt Notes     []  Patient Declined     []  Patient Will Call Back to Schedule     []  Patient Will Schedule with External Provider / Order Routed if Applicable     Additional Care Coordinator Notes:     Diabetic eye exam hyperlinked  Next eye exam due 8/21/24

## 2023-10-16 ENCOUNTER — TELEPHONE (OUTPATIENT)
Dept: FAMILY MEDICINE | Facility: CLINIC | Age: 63
End: 2023-10-16
Payer: COMMERCIAL

## 2023-10-16 DIAGNOSIS — J45.909 ASTHMA, UNSPECIFIED ASTHMA SEVERITY, UNSPECIFIED WHETHER COMPLICATED, UNSPECIFIED WHETHER PERSISTENT: Primary | ICD-10-CM

## 2023-10-16 RX ORDER — ALBUTEROL SULFATE 90 UG/1
2 AEROSOL, METERED RESPIRATORY (INHALATION) EVERY 4 HOURS PRN
Qty: 18 G | Refills: 1 | Status: SHIPPED | OUTPATIENT
Start: 2023-10-16 | End: 2023-10-26 | Stop reason: SDUPTHER

## 2023-10-26 ENCOUNTER — TELEPHONE (OUTPATIENT)
Dept: FAMILY MEDICINE | Facility: CLINIC | Age: 63
End: 2023-10-26
Payer: COMMERCIAL

## 2023-10-26 DIAGNOSIS — J45.909 ASTHMA, UNSPECIFIED ASTHMA SEVERITY, UNSPECIFIED WHETHER COMPLICATED, UNSPECIFIED WHETHER PERSISTENT: ICD-10-CM

## 2023-10-26 RX ORDER — ALBUTEROL SULFATE 90 UG/1
2 AEROSOL, METERED RESPIRATORY (INHALATION) EVERY 4 HOURS PRN
Qty: 18 G | Refills: 11 | Status: SHIPPED | OUTPATIENT
Start: 2023-10-26

## 2023-10-29 DIAGNOSIS — F41.1 GAD (GENERALIZED ANXIETY DISORDER): ICD-10-CM

## 2023-10-30 RX ORDER — DULOXETIN HYDROCHLORIDE 30 MG/1
CAPSULE, DELAYED RELEASE ORAL
Qty: 90 CAPSULE | Refills: 0 | Status: SHIPPED | OUTPATIENT
Start: 2023-10-30 | End: 2024-02-08

## 2023-10-30 RX ORDER — BUPROPION HYDROCHLORIDE 100 MG/1
100 TABLET ORAL
Qty: 90 TABLET | Refills: 0 | Status: SHIPPED | OUTPATIENT
Start: 2023-10-30 | End: 2024-02-08

## 2023-12-13 ENCOUNTER — HOSPITAL ENCOUNTER (EMERGENCY)
Facility: HOSPITAL | Age: 63
Discharge: HOME | End: 2023-12-13
Attending: FAMILY MEDICINE
Payer: COMMERCIAL

## 2023-12-13 VITALS
HEART RATE: 84 BPM | RESPIRATION RATE: 20 BRPM | DIASTOLIC BLOOD PRESSURE: 88 MMHG | HEIGHT: 66 IN | SYSTOLIC BLOOD PRESSURE: 115 MMHG | BODY MASS INDEX: 40.66 KG/M2 | TEMPERATURE: 99 F | WEIGHT: 253 LBS | OXYGEN SATURATION: 100 %

## 2023-12-13 DIAGNOSIS — J44.9 CHRONIC OBSTRUCTIVE PULMONARY DISEASE, UNSPECIFIED COPD TYPE: Primary | ICD-10-CM

## 2023-12-13 DIAGNOSIS — R07.9 CHEST PAIN: ICD-10-CM

## 2023-12-13 LAB
ALBUMIN SERPL-MCNC: 4.7 G/DL (ref 3.4–5)
ALBUMIN/GLOB SERPL: 1.2 RATIO
ALP SERPL-CCNC: 95 UNIT/L (ref 50–144)
ALT SERPL-CCNC: 25 UNIT/L (ref 1–45)
ANION GAP SERPL CALC-SCNC: 9 MEQ/L (ref 2–13)
AST SERPL-CCNC: 28 UNIT/L (ref 14–36)
BASOPHILS # BLD AUTO: 0.05 X10(3)/MCL (ref 0.01–0.08)
BASOPHILS NFR BLD AUTO: 0.8 % (ref 0.1–1.2)
BILIRUB SERPL-MCNC: 0.4 MG/DL (ref 0–1)
BNP BLD-MCNC: 472 PG/ML (ref 0–124.9)
BUN SERPL-MCNC: 20 MG/DL (ref 7–20)
CALCIUM SERPL-MCNC: 9.2 MG/DL (ref 8.4–10.2)
CHLORIDE SERPL-SCNC: 105 MMOL/L (ref 98–110)
CK MB SERPL-MCNC: <0.22 NG/ML (ref 0–3.38)
CK SERPL-CCNC: 49 U/L (ref 30–135)
CO2 SERPL-SCNC: 25 MMOL/L (ref 21–32)
CREAT SERPL-MCNC: 1.04 MG/DL (ref 0.66–1.25)
CREAT/UREA NIT SERPL: 19 (ref 12–20)
D DIMER PPP IA.FEU-MCNC: 0.19 MG/L (ref 0.19–0.5)
EOSINOPHIL # BLD AUTO: 0.27 X10(3)/MCL (ref 0.04–0.36)
EOSINOPHIL NFR BLD AUTO: 4.1 % (ref 0.7–7)
ERYTHROCYTE [DISTWIDTH] IN BLOOD BY AUTOMATED COUNT: 15.3 % (ref 11–14.5)
GFR SERPLBLD CREATININE-BSD FMLA CKD-EPI: 60 MLS/MIN/1.73/M2
GLOBULIN SER-MCNC: 3.8 GM/DL (ref 2–3.9)
GLUCOSE SERPL-MCNC: 141 MG/DL (ref 70–115)
HCT VFR BLD AUTO: 38.2 % (ref 36–48)
HGB BLD-MCNC: 12.6 G/DL (ref 11.8–16)
IMM GRANULOCYTES # BLD AUTO: 0.04 X10(3)/MCL (ref 0–0.03)
IMM GRANULOCYTES NFR BLD AUTO: 0.6 % (ref 0–0.5)
LYMPHOCYTES # BLD AUTO: 2.1 X10(3)/MCL (ref 1.16–3.74)
LYMPHOCYTES NFR BLD AUTO: 31.6 % (ref 20–55)
MCH RBC QN AUTO: 26.6 PG (ref 27–34)
MCHC RBC AUTO-ENTMCNC: 33 G/DL (ref 31–37)
MCV RBC AUTO: 80.6 FL (ref 79–99)
MONOCYTES # BLD AUTO: 0.58 X10(3)/MCL (ref 0.24–0.36)
MONOCYTES NFR BLD AUTO: 8.7 % (ref 4.7–12.5)
NEUTROPHILS # BLD AUTO: 3.61 X10(3)/MCL (ref 1.56–6.13)
NEUTROPHILS NFR BLD AUTO: 54.2 % (ref 37–73)
NRBC BLD AUTO-RTO: 0 %
PLATELET # BLD AUTO: 457 X10(3)/MCL (ref 140–371)
PMV BLD AUTO: 11.4 FL (ref 9.4–12.4)
POTASSIUM SERPL-SCNC: 4.1 MMOL/L (ref 3.5–5.1)
PROT SERPL-MCNC: 8.5 GM/DL (ref 6.3–8.2)
RBC # BLD AUTO: 4.74 X10(6)/MCL (ref 4–5.1)
SODIUM SERPL-SCNC: 139 MMOL/L (ref 135–145)
TROPONIN I SERPL-MCNC: <0.012 NG/ML (ref 0–0.03)
WBC # SPEC AUTO: 6.65 X10(3)/MCL (ref 4–11.5)

## 2023-12-13 PROCEDURE — 82550 ASSAY OF CK (CPK): CPT | Performed by: FAMILY MEDICINE

## 2023-12-13 PROCEDURE — 93010 ELECTROCARDIOGRAM REPORT: CPT | Mod: ,,, | Performed by: INTERNAL MEDICINE

## 2023-12-13 PROCEDURE — 93010 EKG 12-LEAD: ICD-10-PCS | Mod: ,,, | Performed by: INTERNAL MEDICINE

## 2023-12-13 PROCEDURE — 80053 COMPREHEN METABOLIC PANEL: CPT | Performed by: FAMILY MEDICINE

## 2023-12-13 PROCEDURE — 83880 ASSAY OF NATRIURETIC PEPTIDE: CPT | Performed by: FAMILY MEDICINE

## 2023-12-13 PROCEDURE — 82553 CREATINE MB FRACTION: CPT | Performed by: FAMILY MEDICINE

## 2023-12-13 PROCEDURE — 99285 EMERGENCY DEPT VISIT HI MDM: CPT | Mod: 25

## 2023-12-13 PROCEDURE — 93005 ELECTROCARDIOGRAM TRACING: CPT

## 2023-12-13 PROCEDURE — 84484 ASSAY OF TROPONIN QUANT: CPT | Performed by: FAMILY MEDICINE

## 2023-12-13 PROCEDURE — 85025 COMPLETE CBC W/AUTO DIFF WBC: CPT | Performed by: FAMILY MEDICINE

## 2023-12-13 PROCEDURE — 85379 FIBRIN DEGRADATION QUANT: CPT | Performed by: FAMILY MEDICINE

## 2023-12-13 RX ORDER — METFORMIN HYDROCHLORIDE 500 MG/1
500 TABLET ORAL NIGHTLY
COMMUNITY

## 2023-12-13 RX ORDER — DEXAMETHASONE 4 MG/1
4 TABLET ORAL
Status: DISCONTINUED | OUTPATIENT
Start: 2023-12-13 | End: 2023-12-14 | Stop reason: HOSPADM

## 2023-12-13 RX ORDER — DEXAMETHASONE 4 MG/1
4 TABLET ORAL DAILY
Qty: 5 TABLET | Refills: 0 | Status: SHIPPED | OUTPATIENT
Start: 2023-12-13 | End: 2023-12-18

## 2023-12-14 NOTE — ED PROVIDER NOTES
Encounter Date: 2023       History     Chief Complaint   Patient presents with    Shortness of Breath     Onset x3 weeks, worsened tonight.    other     Reports pain under (R) breast     Weakness    Cough     Patient presents to the emergency room with a complaint of some shortness of breath for the past 3 weeks or so.  She also says it hurts under her right breast on and off as well.  She notes that the difficulty breathing is worse on exertion, and better with rest.        Review of patient's allergies indicates:  No Known Allergies  Past Medical History:   Diagnosis Date    Anxiety disorder, unspecified     Asthma     CAD (coronary artery disease)     CHF (congestive heart failure)     Chronic atrial fibrillation     Chronic pain     Chronic venous insufficiency     Diabetes mellitus, type 2     DVT, lower extremity     history    Hyperlipidemia     Hypertension     Left ventricular systolic dysfunction     Low back pain     MDD (major depressive disorder)     Mild mitral valve regurgitation      Past Surgical History:   Procedure Laterality Date    EYE SURGERY      HYSTERECTOMY       Family History   Problem Relation Age of Onset    Heart disease Mother     Brain cancer Brother     Diabetes type II Brother     Kidney failure Brother     Alcohol abuse Brother      Social History     Tobacco Use    Smoking status: Former     Current packs/day: 0.00     Types: Cigarettes     Quit date: 2022     Years since quittin.1    Smokeless tobacco: Never   Substance Use Topics    Alcohol use: Yes     Alcohol/week: 4.0 standard drinks of alcohol     Types: 4 Glasses of wine per week     Comment: 2-3 glaases wine every 2 weeks    Drug use: Not Currently     Review of Systems    Physical Exam     Initial Vitals [23 1901]   BP Pulse Resp Temp SpO2   115/88 84 20 98.6 °F (37 °C) 100 %      MAP       --         Physical Exam    ED Course   Procedures  Labs Reviewed   COMPREHENSIVE METABOLIC PANEL -  Abnormal; Notable for the following components:       Result Value    Glucose Level 141 (*)     Protein Total 8.5 (*)     All other components within normal limits   NT-PRO NATRIURETIC PEPTIDE - Abnormal; Notable for the following components:    ProBNP 472.0 (*)     All other components within normal limits   CBC WITH DIFFERENTIAL - Abnormal; Notable for the following components:    MCH 26.6 (*)     RDW 15.3 (*)     Platelet 457 (*)     Mono # 0.58 (*)     IG# 0.04 (*)     IG% 0.6 (*)     All other components within normal limits   TROPONIN I - Normal   CK - Normal   CK-MB - Normal   D DIMER, QUANTITATIVE - Normal   CBC W/ AUTO DIFFERENTIAL    Narrative:     The following orders were created for panel order CBC Auto Differential.  Procedure                               Abnormality         Status                     ---------                               -----------         ------                     CBC with Differential[[id number]]       Abnormal            Final result                 Please view results for these tests on the individual orders.     EKG Readings: (Independently Interpreted)   Initial Reading: No STEMI. Rhythm: Normal Sinus Rhythm. Ectopy: No Ectopy. Conduction: Normal. ST Segments: Normal ST Segments. T Waves: Normal. Clinical Impression: Normal Sinus Rhythm       Imaging Results              X-Ray Chest AP Portable (In process)                      Medications   dexAMETHasone tablet 4 mg (has no administration in time range)     Medical Decision Making  CBC, Chem 12, BNP unremarkable.  EKG nonischemic chest x-ray normal.    Evidence for this being a cardiac involvement, but she does have a history of COPD.  We will give her a course of steroids and ask her to follow up with her cardiologist.    Amount and/or Complexity of Data Reviewed  Labs: ordered.  Radiology: ordered.                                      Clinical Impression:  Final diagnoses:  [R07.9] Chest pain  [J44.9] Chronic obstructive  pulmonary disease, unspecified COPD type (Primary)          ED Disposition Condition    Discharge Stable          ED Prescriptions       Medication Sig Dispense Start Date End Date Auth. Provider    dexAMETHasone (DECADRON) 4 MG Tab Take 1 tablet (4 mg total) by mouth once daily. for 5 days 5 tablet 12/13/2023 12/18/2023 Gaurav Serna MD          Follow-up Information       Follow up With Specialties Details Why Contact Info    Your Cardiologist  Schedule an appointment as soon as possible for a visit                Gaurav Serna MD  12/13/23 3700

## 2024-01-23 ENCOUNTER — TELEPHONE (OUTPATIENT)
Dept: FAMILY MEDICINE | Facility: CLINIC | Age: 64
End: 2024-01-23

## 2024-01-23 ENCOUNTER — OFFICE VISIT (OUTPATIENT)
Dept: FAMILY MEDICINE | Facility: CLINIC | Age: 64
End: 2024-01-23
Payer: COMMERCIAL

## 2024-01-23 VITALS
DIASTOLIC BLOOD PRESSURE: 82 MMHG | HEART RATE: 78 BPM | SYSTOLIC BLOOD PRESSURE: 122 MMHG | OXYGEN SATURATION: 99 % | WEIGHT: 249 LBS | HEIGHT: 66 IN | TEMPERATURE: 98 F | BODY MASS INDEX: 40.02 KG/M2

## 2024-01-23 DIAGNOSIS — R05.9 COUGH, UNSPECIFIED TYPE: ICD-10-CM

## 2024-01-23 DIAGNOSIS — J45.909 ASTHMA, UNSPECIFIED ASTHMA SEVERITY, UNSPECIFIED WHETHER COMPLICATED, UNSPECIFIED WHETHER PERSISTENT: ICD-10-CM

## 2024-01-23 DIAGNOSIS — E11.59 TYPE 2 DIABETES MELLITUS WITH OTHER CIRCULATORY COMPLICATION, WITHOUT LONG-TERM CURRENT USE OF INSULIN: ICD-10-CM

## 2024-01-23 DIAGNOSIS — I50.9 CONGESTIVE HEART FAILURE, UNSPECIFIED HF CHRONICITY, UNSPECIFIED HEART FAILURE TYPE: ICD-10-CM

## 2024-01-23 DIAGNOSIS — I25.10 CORONARY ARTERY DISEASE, UNSPECIFIED VESSEL OR LESION TYPE, UNSPECIFIED WHETHER ANGINA PRESENT, UNSPECIFIED WHETHER NATIVE OR TRANSPLANTED HEART: ICD-10-CM

## 2024-01-23 DIAGNOSIS — G47.10 DAYTIME HYPERSOMNIA: ICD-10-CM

## 2024-01-23 DIAGNOSIS — Z87.891 FORMER SMOKER: ICD-10-CM

## 2024-01-23 DIAGNOSIS — R06.09 DOE (DYSPNEA ON EXERTION): Primary | ICD-10-CM

## 2024-01-23 PROCEDURE — 3008F BODY MASS INDEX DOCD: CPT | Mod: ,,, | Performed by: NURSE PRACTITIONER

## 2024-01-23 PROCEDURE — 99214 OFFICE O/P EST MOD 30 MIN: CPT | Mod: ,,, | Performed by: NURSE PRACTITIONER

## 2024-01-23 PROCEDURE — 1159F MED LIST DOCD IN RCRD: CPT | Mod: ,,, | Performed by: NURSE PRACTITIONER

## 2024-01-23 PROCEDURE — 3060F POS MICROALBUMINURIA REV: CPT | Mod: ,,, | Performed by: NURSE PRACTITIONER

## 2024-01-23 PROCEDURE — 3044F HG A1C LEVEL LT 7.0%: CPT | Mod: ,,, | Performed by: NURSE PRACTITIONER

## 2024-01-23 PROCEDURE — 3074F SYST BP LT 130 MM HG: CPT | Mod: ,,, | Performed by: NURSE PRACTITIONER

## 2024-01-23 PROCEDURE — 4010F ACE/ARB THERAPY RXD/TAKEN: CPT | Mod: ,,, | Performed by: NURSE PRACTITIONER

## 2024-01-23 PROCEDURE — 1160F RVW MEDS BY RX/DR IN RCRD: CPT | Mod: ,,, | Performed by: NURSE PRACTITIONER

## 2024-01-23 PROCEDURE — 3066F NEPHROPATHY DOC TX: CPT | Mod: ,,, | Performed by: NURSE PRACTITIONER

## 2024-01-23 PROCEDURE — 3079F DIAST BP 80-89 MM HG: CPT | Mod: ,,, | Performed by: NURSE PRACTITIONER

## 2024-01-23 NOTE — PROGRESS NOTES
Patient ID: [id number]     Chief Complaint: Renee       HPI:     Lakesha Caballero is a 63 y.o. female in the office for [employer]     Quit smoking 4 years ago, has been short of breath since.   She quit taking her atorvastatin, bupropion, metformin about a month ago because she feels bloated. She isn't sure if it's helped or not.     She's not sure when she's supposed to follow up with cardiology again.  She continues to have ALEMAN and unable to walk into the office from the parking lot without having to stop and catch her breath.  She's requesting a motorized wheelchair to help her get around both at home and in public doing things like grocery shopping and other ADLs. She has a history of asthma.  Using albuterol inhaler, not sure how often.  Thinks it helps a little bit when she uses it.  She was a heavy smoker until a little over a year ago.      [place] 12, Risk Assessment Score 8.  Denies ever having had a sleep study. She has daytime hypersomnia and numerous risk factors.     Past Medical History:  has a past medical history of Anxiety disorder, unspecified, Asthma, CAD (coronary artery disease), CHF (congestive heart failure), Chronic atrial fibrillation, Chronic pain, Chronic venous insufficiency, Diabetes mellitus, type 2, DVT, lower extremity, Hyperlipidemia, Hypertension, Left ventricular systolic dysfunction, Low back pain, MDD (major depressive disorder), and Mild mitral valve regurgitation.    Social History:  reports that she quit smoking about 16 months ago. Her smoking use included cigarettes. She has never used smokeless tobacco. She reports current alcohol use of about 4.0 standard drinks of alcohol per week. She reports that she does not currently use drugs.    Current Outpatient Medications   Medication Instructions    acetaminophen (TYLENOL) 1,300 mg, Oral, Every 8 hours PRN    albuterol (PROVENTIL/VENTOLIN HFA) 90 mcg/actuation inhaler 2 puffs, Inhalation, Every 4 hours PRN    alcohol swabs  "(ALCOHOL PREP PADS) PadM USE 1 TIME DAILY    apixaban (ELIQUIS) 5 mg, Oral, 2 times daily    aspirin (ECOTRIN) 81 mg, Oral, Daily    atorvastatin (LIPITOR) 40 mg, Oral, Nightly    blood sugar diagnostic (TRUE METRIX GLUCOSE TEST STRIP) Strp 1 strip, Misc.(Non-Drug; Combo Route), 2 times daily    carvediloL (COREG) 25 mg, Oral, 2 times daily    ibuprofen-diphenhydrAMINE cit (IBUPROFEN PM) 200-38 mg Tab 2 tablets, Oral, Nightly    isosorbide mononitrate (IMDUR) 30 mg, Oral, Daily    lancets (ONETOUCH DELICA PLUS LANCET) 33 gauge Misc USE 1 TIME A DAY AS        DIRECTED    lancets 18 gauge Misc 1 lancet , Misc.(Non-Drug; Combo Route), 2 times daily    metFORMIN (GLUCOPHAGE) 500 mg, Oral, Nightly    OZEMPIC 1 mg, Subcutaneous, Every 7 days    sacubitriL-valsartan (ENTRESTO)  mg per tablet 1 tablet, Oral, 2 times daily       Patient has No Known Allergies.     Patient Care Team:  Layne Daniel FNP-C as PCP - General (Family Medicine)  Sahil Farr MD as Consulting Physician (Cardiology)  Kei Lantigua OD as Consulting Physician (Optometry)     Subjective     Review of Systems    See HPI     Objective     Visit Vitals  /82 (BP Location: Left arm)   Pulse 78   Temp 98.1 °F (36.7 °C) (Temporal)   Ht 5' 6" (1.676 m)   Wt 112.9 kg (249 lb)   SpO2 99%   BMI 40.19 kg/m²       Physical Exam  Vitals reviewed.   Constitutional:       Appearance: Normal appearance. She is obese.   Cardiovascular:      Rate and Rhythm: Normal rate and regular rhythm.      Heart sounds: Normal heart sounds.   Pulmonary:      Effort: Pulmonary effort is normal.      Breath sounds: Wheezing present.   Musculoskeletal:      Cervical back: No tenderness.      Right lower leg: Edema present.      Left lower leg: Edema present.   Lymphadenopathy:      Cervical: No cervical adenopathy.   Skin:     General: Skin is warm and dry.   Neurological:      Mental Status: She is alert and oriented to person, place, and time. "      Gait: Gait abnormal.   Psychiatric:         Mood and Affect: Mood normal.         Behavior: Behavior normal.         Assessment & Plan:     1. ALEMAN (dyspnea on exertion)  Comments:  get PFT, CT chest, sleep study and RTC for resutls.  Orders:  -     CT Chest Without Contrast; Future; Expected date: 03/19/2024  -     Complete PFT w/ bronchodilator; Future    2. Congestive heart failure, unspecified HF chronicity, unspecified heart failure type    3. Cough, unspecified type  -     CT Chest Without Contrast; Future; Expected date: 03/19/2024  -     Complete PFT w/ bronchodilator; Future    4. Asthma, unspecified asthma severity, unspecified whether complicated, unspecified whether persistent  -     Complete PFT w/ bronchodilator; Future    5. Former smoker    6. Coronary artery disease, unspecified vessel or lesion type, unspecified whether angina present, unspecified whether native or transplanted heart    7. Type 2 diabetes mellitus with other circulatory complication, without long-term current use of insulin    8. Daytime hypersomnia  -     Ambulatory referral/consult to Sleep Disorders; Future; Expected date: 03/26/2024         Follow up for Results. In addition to their next scheduled appointment, the patient has also been instructed to follow up on as needed basis.     Future Appointments   Date Time Provider Department Center   7/31/2024  9:10 AM LAB, [facility] LABORATORY DRAW STATION ROMEO MERLY [facility]   8/7/2024  1:00 PM Layne Daniel FNP-C [facility]   2/4/2025  9:10 AM LAB, [facility] LABORATORY DRAW STATION ROMEO MERLY [facility]   2/11/2025  2:00 PM [facility]

## 2024-01-23 NOTE — TELEPHONE ENCOUNTER
Please get the necessary paperwork from one of these companies and start filling it out.  Thanks :)

## 2024-01-24 NOTE — TELEPHONE ENCOUNTER
Ericka said no  [facility] number is disconnected  Lindy said to call [employer]  Lefty messgae with [employer].

## 2024-01-29 ENCOUNTER — TELEPHONE (OUTPATIENT)
Dept: FAMILY MEDICINE | Facility: CLINIC | Age: 64
End: 2024-01-29
Payer: COMMERCIAL

## 2024-01-29 DIAGNOSIS — I48.20 CHRONIC ATRIAL FIBRILLATION: ICD-10-CM

## 2024-01-29 NOTE — TELEPHONE ENCOUNTER
Spoke with someone at [facility] again, they again took the information and said a consultant would call back.

## 2024-01-30 RX ORDER — SACUBITRIL AND VALSARTAN 97; 103 MG/1; MG/1
1 TABLET, FILM COATED ORAL 2 TIMES DAILY
Qty: 90 TABLET | Refills: 0 | OUTPATIENT
Start: 2024-01-30

## 2024-02-05 PROCEDURE — 80061 LIPID PANEL: CPT | Performed by: NURSE PRACTITIONER

## 2024-02-05 PROCEDURE — 83036 HEMOGLOBIN GLYCOSYLATED A1C: CPT | Performed by: NURSE PRACTITIONER

## 2024-02-05 PROCEDURE — 80053 COMPREHEN METABOLIC PANEL: CPT | Performed by: NURSE PRACTITIONER

## 2024-02-05 PROCEDURE — 82043 UR ALBUMIN QUANTITATIVE: CPT | Performed by: NURSE PRACTITIONER

## 2024-02-05 PROCEDURE — 85025 COMPLETE CBC W/AUTO DIFF WBC: CPT | Performed by: NURSE PRACTITIONER

## 2024-02-07 ENCOUNTER — OFFICE VISIT (OUTPATIENT)
Dept: FAMILY MEDICINE | Facility: CLINIC | Age: 64
End: 2024-02-07
Payer: COMMERCIAL

## 2024-02-07 VITALS
TEMPERATURE: 98 F | HEIGHT: 66 IN | SYSTOLIC BLOOD PRESSURE: 120 MMHG | WEIGHT: 251.38 LBS | BODY MASS INDEX: 40.4 KG/M2 | DIASTOLIC BLOOD PRESSURE: 62 MMHG | OXYGEN SATURATION: 95 % | HEART RATE: 100 BPM

## 2024-02-07 DIAGNOSIS — I50.9 CONGESTIVE HEART FAILURE, UNSPECIFIED HF CHRONICITY, UNSPECIFIED HEART FAILURE TYPE: ICD-10-CM

## 2024-02-07 DIAGNOSIS — E11.59 TYPE 2 DIABETES MELLITUS WITH OTHER CIRCULATORY COMPLICATION, WITHOUT LONG-TERM CURRENT USE OF INSULIN: ICD-10-CM

## 2024-02-07 DIAGNOSIS — I48.20 CHRONIC ATRIAL FIBRILLATION: ICD-10-CM

## 2024-02-07 DIAGNOSIS — Z00.01 ENCOUNTER FOR ROUTINE ADULT HEALTH EXAMINATION WITH ABNORMAL FINDINGS: Primary | ICD-10-CM

## 2024-02-07 DIAGNOSIS — Z12.31 SCREENING MAMMOGRAM FOR BREAST CANCER: ICD-10-CM

## 2024-02-07 DIAGNOSIS — Z86.718 HISTORY OF DEEP VENOUS THROMBOSIS: ICD-10-CM

## 2024-02-07 DIAGNOSIS — Z13.9 SCREENING FOR CONDITION: ICD-10-CM

## 2024-02-07 DIAGNOSIS — Z23 ENCOUNTER FOR IMMUNIZATION: ICD-10-CM

## 2024-02-07 PROCEDURE — G0008 ADMIN INFLUENZA VIRUS VAC: HCPCS | Mod: ,,, | Performed by: NURSE PRACTITIONER

## 2024-02-07 PROCEDURE — 3060F POS MICROALBUMINURIA REV: CPT | Mod: ,,, | Performed by: NURSE PRACTITIONER

## 2024-02-07 PROCEDURE — 1160F RVW MEDS BY RX/DR IN RCRD: CPT | Mod: ,,, | Performed by: NURSE PRACTITIONER

## 2024-02-07 PROCEDURE — 3008F BODY MASS INDEX DOCD: CPT | Mod: ,,, | Performed by: NURSE PRACTITIONER

## 2024-02-07 PROCEDURE — 3074F SYST BP LT 130 MM HG: CPT | Mod: ,,, | Performed by: NURSE PRACTITIONER

## 2024-02-07 PROCEDURE — 3044F HG A1C LEVEL LT 7.0%: CPT | Mod: ,,, | Performed by: NURSE PRACTITIONER

## 2024-02-07 PROCEDURE — 99497 ADVNCD CARE PLAN 30 MIN: CPT | Mod: ,,, | Performed by: NURSE PRACTITIONER

## 2024-02-07 PROCEDURE — 3078F DIAST BP <80 MM HG: CPT | Mod: ,,, | Performed by: NURSE PRACTITIONER

## 2024-02-07 PROCEDURE — 99397 PER PM REEVAL EST PAT 65+ YR: CPT | Mod: ,,, | Performed by: NURSE PRACTITIONER

## 2024-02-07 PROCEDURE — 90677 PCV20 VACCINE IM: CPT | Mod: ,,, | Performed by: NURSE PRACTITIONER

## 2024-02-07 PROCEDURE — G0009 ADMIN PNEUMOCOCCAL VACCINE: HCPCS | Mod: ,,, | Performed by: NURSE PRACTITIONER

## 2024-02-07 PROCEDURE — 3066F NEPHROPATHY DOC TX: CPT | Mod: ,,, | Performed by: NURSE PRACTITIONER

## 2024-02-07 PROCEDURE — 90694 VACC AIIV4 NO PRSRV 0.5ML IM: CPT | Mod: ,,, | Performed by: NURSE PRACTITIONER

## 2024-02-07 PROCEDURE — 1159F MED LIST DOCD IN RCRD: CPT | Mod: ,,, | Performed by: NURSE PRACTITIONER

## 2024-02-07 NOTE — PROGRESS NOTES
Family Medicine      Patient ID: [id number]     Chief Complaint: Medicare Annual Wellness     HPI:     Lakesha Caballero is a 63 y.o. female here today for a Medicare Annual Wellness visit and comprehensive Health Risk Assessment.     She reports being referred to [facility] in [place] by Dr. Farr on 24.  She has follow up on 3/11/24 for results.     Health Maintenance         Date Due Completion Date    Foot Exam Never done ---    HIV Screening Never done ---    DEXA Scan Never done ---    Shingles Vaccine (1 of 2) Never done ---    TETANUS VACCINE 10/06/2015 10/6/2005    RSV Vaccine (Age 60+ and Pregnant patients) (1 - 1-dose 60+ series) Never done ---    Mammogram 06/10/2023 6/10/2022    COVID-19 Vaccine ( season) 2023 8/3/2022    Hemoglobin A1c 2024    Eye Exam 2024    Diabetes Urine Screening 2025    Lipid Panel 2025    High Dose Statin 2025    Colorectal Cancer Screening 2025             Past Medical History:   Diagnosis Date    Anxiety disorder, unspecified     Asthma     CAD (coronary artery disease)     CHF (congestive heart failure)     Chronic atrial fibrillation     Chronic pain     Chronic venous insufficiency     Diabetes mellitus, type 2     DVT, lower extremity     history    Hyperlipidemia     Hypertension     Left ventricular systolic dysfunction     Low back pain     MDD (major depressive disorder)     Mild mitral valve regurgitation         Past Surgical History:   Procedure Laterality Date    EYE SURGERY      HYSTERECTOMY          Social History     Socioeconomic History    Marital status:    Tobacco Use    Smoking status: Former     Current packs/day: 0.00     Types: Cigarettes     Quit date: 2022     Years since quittin.2    Smokeless tobacco: Never   Substance and Sexual Activity    Alcohol use: Yes     Alcohol/week: 4.0 standard drinks of  alcohol     Types: 4 Glasses of wine per week     Comment: 2-3 glaases wine every 2 weeks    Drug use: Not Currently    Sexual activity: Not Currently     Partners: Female     Birth control/protection: None        Family History   Problem Relation Age of Onset    Heart disease Mother     Brain cancer Brother     Diabetes type II Brother     Kidney failure Brother     Alcohol abuse Brother         Current Outpatient Medications   Medication Instructions    acetaminophen (TYLENOL) 1,300 mg, Oral, Every 8 hours PRN    albuterol (PROVENTIL/VENTOLIN HFA) 90 mcg/actuation inhaler 2 puffs, Inhalation, Every 4 hours PRN    apixaban (ELIQUIS) 5 mg, Oral, 2 times daily    aspirin (ECOTRIN) 81 mg, Oral, Daily    atorvastatin (LIPITOR) 40 mg, Oral, Nightly    blood sugar diagnostic (TRUE METRIX GLUCOSE TEST STRIP) Strp 1 strip, Misc.(Non-Drug; Combo Route), 2 times daily    carvediloL (COREG) 25 mg, Oral, 2 times daily    ibuprofen-diphenhydrAMINE cit (IBUPROFEN PM) 200-38 mg Tab 2 tablets, Oral, Nightly    isosorbide mononitrate (IMDUR) 30 mg, Oral, Daily    lancets 18 gauge Misc 1 lancet , Misc.(Non-Drug; Combo Route), 2 times daily    metFORMIN (GLUCOPHAGE) 500 mg, Oral, Nightly    OZEMPIC 1 mg, Subcutaneous, Every 7 days    sacubitriL-valsartan (ENTRESTO)  mg per tablet 1 tablet, Oral, 2 times daily       Review of patient's allergies indicates:  No Known Allergies     Immunization History   Administered Date(s) Administered    COVID-19, MRNA, LN-S, PF (MODERNA FULL 0.5 ML DOSE) 12/01/2021, 08/03/2022    COVID-19, MRNA, LN-S, PF (Pfizer) (Purple Cap) 03/05/2021, 03/26/2021    Influenza (FLUAD) - Quadrivalent - Adjuvanted - PF *Preferred* (65+) 02/07/2024    Influenza - Quadrivalent - PF *Preferred* (6 months and older) 11/14/2018, 09/26/2020, 09/24/2021, 11/01/2022    Influenza - Trivalent - PF (ADULT) 01/20/2018, 10/07/2019    Pneumococcal Conjugate - 20 Valent 02/07/2024    Td (ADULT)  "10/06/2005        Patient Care Team:  Layne Daniel FNP-C as PCP - General (Family Medicine)  Sahil Farr MD as Consulting Physician (Cardiology)  Kei Lantgiua OD as Consulting Physician (Optometry)    Subjective:     Review of Systems    12 point review of systems conducted, negative except as stated in the history of present illness. See HPI for details.    Objective:     Visit Vitals  /62 (BP Location: Right arm, Patient Position: Sitting, BP Method: Medium (Manual))   Pulse 100   Temp 98.2 °F (36.8 °C) (Temporal)   Ht 5' 5.98" (1.676 m)   Wt 114 kg (251 lb 6.4 oz)   SpO2 95%   BMI 40.60 kg/m²       Physical Exam  Vitals reviewed.   Constitutional:       General: She is not in acute distress.  HENT:      Head: Normocephalic.      Right Ear: External ear normal.      Left Ear: External ear normal.      Nose: Nose normal.      Mouth/Throat:      Mouth: Mucous membranes are moist.      Pharynx: Oropharynx is clear.   Cardiovascular:      Rate and Rhythm: Normal rate and regular rhythm.      Heart sounds: Normal heart sounds.   Pulmonary:      Effort: Pulmonary effort is normal.      Breath sounds: Normal breath sounds.   Abdominal:      General: Bowel sounds are normal.      Palpations: Abdomen is soft.      Tenderness: There is no abdominal tenderness.   Musculoskeletal:         General: Normal range of motion.      Cervical back: Normal range of motion and neck supple.      Right lower leg: No edema.      Left lower leg: No edema.   Skin:     General: Skin is warm and dry.   Neurological:      Mental Status: She is alert and oriented to person, place, and time. Mental status is at baseline.   Psychiatric:         Mood and Affect: Mood normal.         Behavior: Behavior normal.       Assessment:       ICD-10-CM ICD-9-CM   1. Encounter for routine adult health examination with abnormal findings  Z00.01 V70.0   2. Screening mammogram for breast cancer  Z12.31 V76.12   3. Screening for " condition  Z13.9 V82.9   4. Encounter for immunization  Z23 V03.89   5. History of deep venous thrombosis  Z86.718 V12.51   6. Chronic atrial fibrillation  I48.20 427.31   7. Congestive heart failure, unspecified HF chronicity, unspecified heart failure type  I50.9 428.0   8. Type 2 diabetes mellitus with other circulatory complication, without long-term current use of insulin  E11.59 250.70        Plan:     1. Encounter for routine adult health examination with abnormal findings    2. Screening mammogram for breast cancer  -     Mammo Digital Screening Bilat w/ Alberto; Future; Expected date: 02/07/2024    3. Screening for condition  -     CT Bone Density Study 1 + Sites Axial; Future; Expected date: 02/07/2024    4. Encounter for immunization  -     (In Office Administered) Pneumococcal Conjugate Vaccine (20 Valent) (IM) (Preferred)  -     Influenza (FLUAD) - Quadrivalent (Adjuvanted) *Preferred* (65+) (PF)    5. History of deep venous thrombosis  -     apixaban (ELIQUIS) 5 mg Tab; Take 1 tablet (5 mg total) by mouth 2 (two) times a day.  Dispense: 90 tablet; Refill: 3    6. Chronic atrial fibrillation    7. Congestive heart failure, unspecified HF chronicity, unspecified heart failure type    8. Type 2 diabetes mellitus with other circulatory complication, without long-term current use of insulin         The following assessments were completed and reviewed. See completed screening forms and assessments within the Encounter Summary.   [x] Health Risk Assessment   [x] CVD Risk Factors   [x] Obesity/Physical Activity -  Encouraged daily 30 minute physical activity x 5 days per week.   [x] Home Safety/Living Situation   [x] Alcohol Screen  [x] Depression (PHQ) Screen   [x] Timed Get Up and Go   [x] Whisper Test  [x] Cognitive Function/Impairment Screen   [x] Nutrition Screening  [x] ADL Screen   [x] Opioid Screen:  [x] Patient does not have a prescription for opioids.   [] Patient has a prescription for opioids but is  at low risk for abuse.   [x] Substance Abuse Screen:   [x] Patient does not use substances.   [] Patient screens positive for substance use disorder.   Advance Care Planning   Advance Care Planning     Date: 02/07/2024       I attest to discussing Advance Care Planning with patient and/or family member.  Education was provided including the importance of the Health Care Power of , Advance Directives, and/or [place] documentation.  The patient expressed understanding to the importance of this information and discussion.       Provided patient with a 5-10 year written screening schedule and personal prevention plan. Recommendations were developed using the USPSTF age appropriate recommendations. Education, counseling, and referrals were provided as needed. After Visit Summary printed and given to patient, which includes a list of additional screenings\tests needed.    Follow up for 1), 6 mo f/u, Routine, non-fasting labs prior, 2), 1 yr, Wellness, fasting labs prior. In addition to their scheduled follow up, the patient has also been instructed to follow up on as needed basis.     Future Appointments   Date Time Provider Department Center   2/16/2024 10:30 AM OA MAMMO1 OAL MAMMO [facility]   2/16/2024 11:00 AM OA CT1 610 LB LIMIT OA CTSCAN [facility]   7/31/2024  9:10 AM LAB, [facility] LABORATORY DRAW STATION TIAN [facility]   8/7/2024  1:00 PM Layne Daniel FNP-C [facility]   2/4/2025  9:10 AM LAB, [facility] LABORATORY DRAW STATION TIAN [place]   2/11/2025  2:00 PM Layne Daniel FNP-C JER FAUSTINA [facility]

## 2024-02-14 ENCOUNTER — TELEPHONE (OUTPATIENT)
Dept: FAMILY MEDICINE | Facility: CLINIC | Age: 64
End: 2024-02-14
Payer: COMMERCIAL

## 2024-02-14 NOTE — TELEPHONE ENCOUNTER
Spoke with patient. She voiced understanding. Portable wheelchair was denied through her insurance.

## 2024-02-15 ENCOUNTER — DOCUMENTATION ONLY (OUTPATIENT)
Dept: FAMILY MEDICINE | Facility: CLINIC | Age: 64
End: 2024-02-15
Payer: COMMERCIAL

## 2024-02-15 ENCOUNTER — TELEPHONE (OUTPATIENT)
Dept: FAMILY MEDICINE | Facility: CLINIC | Age: 64
End: 2024-02-15
Payer: COMMERCIAL

## 2024-02-15 NOTE — PROGRESS NOTES
Spoke with patient and explained to her that I tried all morning and was being sent in circles and that I couldn't not get it done.  She voiced understanding and thanked me for trying.

## 2024-02-15 NOTE — PROGRESS NOTES
I received a phone call from [employer] this morning stating that the wheelchair was not denied that it needed a code, I had previously sent them a list of all her DX codes.  They told me to call another number ([phone]) to get a peer to peer review.  I called this number and the lady told me that it needed a code not a peer to peer review and she gave me yet another number to call ([phone]), so I called this number and they told me because it was missing the right code it had be in fact denied and that a peer to peer was needed.

## 2024-02-16 ENCOUNTER — HOSPITAL ENCOUNTER (OUTPATIENT)
Dept: RADIOLOGY | Facility: HOSPITAL | Age: 64
Discharge: HOME | End: 2024-02-16
Attending: NURSE PRACTITIONER
Payer: COMMERCIAL

## 2024-02-16 DIAGNOSIS — Z13.9 SCREENING FOR CONDITION: ICD-10-CM

## 2024-02-16 DIAGNOSIS — Z12.31 SCREENING MAMMOGRAM FOR BREAST CANCER: ICD-10-CM

## 2024-02-16 PROCEDURE — 77078 CT BONE DENSITY AXIAL: CPT | Mod: TC

## 2024-02-16 PROCEDURE — 77067 SCR MAMMO BI INCL CAD: CPT | Mod: TC

## 2024-02-19 ENCOUNTER — TELEPHONE (OUTPATIENT)
Dept: FAMILY MEDICINE | Facility: CLINIC | Age: 64
End: 2024-02-19
Payer: COMMERCIAL

## 2024-02-19 DIAGNOSIS — I48.20 CHRONIC ATRIAL FIBRILLATION: ICD-10-CM

## 2024-02-19 DIAGNOSIS — Z86.718 HISTORY OF DEEP VENOUS THROMBOSIS: ICD-10-CM

## 2024-02-19 RX ORDER — SACUBITRIL AND VALSARTAN 97; 103 MG/1; MG/1
1 TABLET, FILM COATED ORAL 2 TIMES DAILY
Qty: 14 TABLET | Refills: 0 | Status: SHIPPED | OUTPATIENT
Start: 2024-02-19

## 2024-02-19 NOTE — TELEPHONE ENCOUNTER
----- Message from LARON Dominique-MO sent at 2/16/2024  4:15 PM CST -----  Please inform patient of results:    Results of bone density testing indicate osteopenia which is reduced bone density.  Suggest weight-bearing exercise and over-the-counter supplement: vitamin-D 800 IU daily and calcium 500 mg daily.

## 2024-02-20 ENCOUNTER — TELEPHONE (OUTPATIENT)
Dept: FAMILY MEDICINE | Facility: CLINIC | Age: 64
End: 2024-02-20
Payer: COMMERCIAL

## 2024-02-20 NOTE — TELEPHONE ENCOUNTER
----- Message from MIGUEL Dominique sent at 2/19/2024  5:17 PM CST -----  Normal MMG. Repeat in 1 year.

## 2024-03-19 DIAGNOSIS — E11.59 TYPE 2 DIABETES MELLITUS WITH OTHER CIRCULATORY COMPLICATION, WITHOUT LONG-TERM CURRENT USE OF INSULIN: Primary | ICD-10-CM

## 2024-03-19 PROBLEM — Z87.891 FORMER SMOKER: Status: ACTIVE | Noted: 2024-03-19

## 2024-03-19 RX ORDER — LANCETS 33 GAUGE
EACH MISCELLANEOUS
Qty: 100 EACH | Refills: 11 | Status: SHIPPED | OUTPATIENT
Start: 2024-03-19

## 2024-03-19 RX ORDER — UBIQUINOL 100 MG
CAPSULE ORAL
Qty: 100 EACH | Refills: 11 | Status: SHIPPED | OUTPATIENT
Start: 2024-03-19

## 2024-03-26 ENCOUNTER — PROCEDURE VISIT (OUTPATIENT)
Dept: RESPIRATORY THERAPY | Facility: HOSPITAL | Age: 64
End: 2024-03-26
Attending: NURSE PRACTITIONER
Payer: COMMERCIAL

## 2024-03-26 ENCOUNTER — HOSPITAL ENCOUNTER (OUTPATIENT)
Dept: RADIOLOGY | Facility: HOSPITAL | Age: 64
Discharge: HOME | End: 2024-03-26
Attending: NURSE PRACTITIONER
Payer: COMMERCIAL

## 2024-03-26 DIAGNOSIS — J45.909 ASTHMA, UNSPECIFIED ASTHMA SEVERITY, UNSPECIFIED WHETHER COMPLICATED, UNSPECIFIED WHETHER PERSISTENT: ICD-10-CM

## 2024-03-26 DIAGNOSIS — R06.09 DOE (DYSPNEA ON EXERTION): ICD-10-CM

## 2024-03-26 DIAGNOSIS — R05.9 COUGH, UNSPECIFIED TYPE: ICD-10-CM

## 2024-03-26 PROCEDURE — 94010 BREATHING CAPACITY TEST: CPT

## 2024-03-26 PROCEDURE — 94727 GAS DIL/WSHOT DETER LNG VOL: CPT

## 2024-03-26 PROCEDURE — 94729 DIFFUSING CAPACITY: CPT

## 2024-03-26 PROCEDURE — 71250 CT THORAX DX C-: CPT | Mod: TC

## 2024-03-26 PROCEDURE — 94640 AIRWAY INHALATION TREATMENT: CPT | Mod: 59

## 2024-03-26 RX ORDER — ALBUTEROL SULFATE 0.83 MG/ML
2.5 SOLUTION RESPIRATORY (INHALATION)
Status: COMPLETED | OUTPATIENT
Start: 2024-03-26 | End: 2024-03-26

## 2024-03-26 RX ADMIN — ALBUTEROL SULFATE 2.5 MG: 0.83 SOLUTION RESPIRATORY (INHALATION) at 01:03

## 2024-04-16 ENCOUNTER — TELEPHONE (OUTPATIENT)
Dept: FAMILY MEDICINE | Facility: CLINIC | Age: 64
End: 2024-04-16
Payer: COMMERCIAL

## 2024-04-16 NOTE — TELEPHONE ENCOUNTER
Spoke with patient and explained to her that it is very difficult to get a electric scooter, that just because they gave her the code that she needs to get it does not mean that she qualifies for that code.  Told her that we need to know what the criteria is.

## 2024-04-22 ENCOUNTER — TELEPHONE (OUTPATIENT)
Dept: FAMILY MEDICINE | Facility: CLINIC | Age: 64
End: 2024-04-22
Payer: COMMERCIAL

## 2024-04-29 ENCOUNTER — HOSPITAL ENCOUNTER (OUTPATIENT)
Dept: RADIOLOGY | Facility: HOSPITAL | Age: 64
Discharge: HOME | End: 2024-04-29
Attending: INTERNAL MEDICINE
Payer: COMMERCIAL

## 2024-04-29 DIAGNOSIS — R94.39 ABNORMAL CARDIOVASCULAR STRESS TEST: ICD-10-CM

## 2024-04-29 PROCEDURE — 71046 X-RAY EXAM CHEST 2 VIEWS: CPT | Mod: TC

## 2024-05-14 ENCOUNTER — TELEPHONE (OUTPATIENT)
Dept: FAMILY MEDICINE | Facility: CLINIC | Age: 64
End: 2024-05-14
Payer: COMMERCIAL

## 2024-05-23 ENCOUNTER — HOSPITAL ENCOUNTER (OUTPATIENT)
Facility: HOSPITAL | Age: 64
Discharge: HOME | End: 2024-05-24
Attending: INTERNAL MEDICINE | Admitting: INTERNAL MEDICINE
Payer: COMMERCIAL

## 2024-05-23 DIAGNOSIS — R94.39 ABNORMAL CARDIOVASCULAR STRESS TEST: ICD-10-CM

## 2024-05-23 DIAGNOSIS — I51.9 LEFT VENTRICULAR SYSTOLIC DYSFUNCTION: Primary | ICD-10-CM

## 2024-05-23 LAB
POCT GLUCOSE: 118 MG/DL (ref 70–110)
POCT GLUCOSE: 129 MG/DL (ref 70–110)
POCT GLUCOSE: 98 MG/DL (ref 70–110)

## 2024-05-23 PROCEDURE — 25000003 PHARM REV CODE 250: Performed by: INTERNAL MEDICINE

## 2024-05-23 PROCEDURE — 94761 N-INVAS EAR/PLS OXIMETRY MLT: CPT | Mod: 59

## 2024-05-23 PROCEDURE — 99900035 HC TECH TIME PER 15 MIN (STAT)

## 2024-05-23 PROCEDURE — 93458 L HRT ARTERY/VENTRICLE ANGIO: CPT | Performed by: INTERNAL MEDICINE

## 2024-05-23 PROCEDURE — C1887 CATHETER, GUIDING: HCPCS | Performed by: INTERNAL MEDICINE

## 2024-05-23 PROCEDURE — 63600175 PHARM REV CODE 636 W HCPCS: Performed by: INTERNAL MEDICINE

## 2024-05-23 PROCEDURE — G0378 HOSPITAL OBSERVATION PER HR: HCPCS

## 2024-05-23 PROCEDURE — 99152 MOD SED SAME PHYS/QHP 5/>YRS: CPT | Performed by: INTERNAL MEDICINE

## 2024-05-23 PROCEDURE — C1894 INTRO/SHEATH, NON-LASER: HCPCS | Performed by: INTERNAL MEDICINE

## 2024-05-23 PROCEDURE — C1769 GUIDE WIRE: HCPCS | Performed by: INTERNAL MEDICINE

## 2024-05-23 PROCEDURE — 25500020 PHARM REV CODE 255: Performed by: INTERNAL MEDICINE

## 2024-05-23 PROCEDURE — 99153 MOD SED SAME PHYS/QHP EA: CPT | Performed by: INTERNAL MEDICINE

## 2024-05-23 RX ORDER — HEPARIN SODIUM 5000 [USP'U]/ML
5000 INJECTION, SOLUTION INTRAVENOUS; SUBCUTANEOUS EVERY 8 HOURS
Status: DISCONTINUED | OUTPATIENT
Start: 2024-05-23 | End: 2024-05-23

## 2024-05-23 RX ORDER — TALC
6 POWDER (GRAM) TOPICAL NIGHTLY PRN
Status: DISCONTINUED | OUTPATIENT
Start: 2024-05-23 | End: 2024-05-24 | Stop reason: HOSPADM

## 2024-05-23 RX ORDER — SODIUM CHLORIDE 0.9 % (FLUSH) 0.9 %
10 SYRINGE (ML) INJECTION
Status: DISCONTINUED | OUTPATIENT
Start: 2024-05-23 | End: 2024-05-24 | Stop reason: HOSPADM

## 2024-05-23 RX ORDER — ONDANSETRON HYDROCHLORIDE 2 MG/ML
4 INJECTION, SOLUTION INTRAVENOUS EVERY 8 HOURS PRN
Status: DISCONTINUED | OUTPATIENT
Start: 2024-05-23 | End: 2024-05-24 | Stop reason: HOSPADM

## 2024-05-23 RX ORDER — HEPARIN SODIUM 1000 [USP'U]/ML
INJECTION, SOLUTION INTRAVENOUS; SUBCUTANEOUS
Status: DISCONTINUED | OUTPATIENT
Start: 2024-05-23 | End: 2024-05-23 | Stop reason: HOSPADM

## 2024-05-23 RX ORDER — DIAZEPAM 5 MG/1
10 TABLET ORAL
Status: DISPENSED | OUTPATIENT
Start: 2024-05-23

## 2024-05-23 RX ORDER — INSULIN ASPART 100 [IU]/ML
0-10 INJECTION, SOLUTION INTRAVENOUS; SUBCUTANEOUS
Status: DISCONTINUED | OUTPATIENT
Start: 2024-05-23 | End: 2024-05-24 | Stop reason: HOSPADM

## 2024-05-23 RX ORDER — ISOSORBIDE MONONITRATE 30 MG/1
30 TABLET, EXTENDED RELEASE ORAL DAILY
Status: DISCONTINUED | OUTPATIENT
Start: 2024-05-24 | End: 2024-05-24 | Stop reason: HOSPADM

## 2024-05-23 RX ORDER — DIPHENHYDRAMINE HCL 25 MG
50 CAPSULE ORAL
Status: DISPENSED | OUTPATIENT
Start: 2024-05-23

## 2024-05-23 RX ORDER — LIDOCAINE HYDROCHLORIDE 20 MG/ML
INJECTION, SOLUTION EPIDURAL; INFILTRATION; INTRACAUDAL; PERINEURAL
Status: DISCONTINUED | OUTPATIENT
Start: 2024-05-23 | End: 2024-05-23 | Stop reason: HOSPADM

## 2024-05-23 RX ORDER — ATORVASTATIN CALCIUM 40 MG/1
40 TABLET, FILM COATED ORAL NIGHTLY
Status: DISCONTINUED | OUTPATIENT
Start: 2024-05-23 | End: 2024-05-24 | Stop reason: HOSPADM

## 2024-05-23 RX ORDER — MIDAZOLAM HYDROCHLORIDE 1 MG/ML
INJECTION, SOLUTION INTRAMUSCULAR; INTRAVENOUS
Status: DISCONTINUED | OUTPATIENT
Start: 2024-05-23 | End: 2024-05-23 | Stop reason: HOSPADM

## 2024-05-23 RX ORDER — ALBUTEROL SULFATE 0.83 MG/ML
2.5 SOLUTION RESPIRATORY (INHALATION) EVERY 4 HOURS PRN
Status: DISCONTINUED | OUTPATIENT
Start: 2024-05-23 | End: 2024-05-24 | Stop reason: HOSPADM

## 2024-05-23 RX ORDER — CARVEDILOL 25 MG/1
25 TABLET ORAL 2 TIMES DAILY
Status: DISCONTINUED | OUTPATIENT
Start: 2024-05-23 | End: 2024-05-24 | Stop reason: HOSPADM

## 2024-05-23 RX ORDER — ACETAMINOPHEN 325 MG/1
650 TABLET ORAL EVERY 8 HOURS PRN
Status: DISCONTINUED | OUTPATIENT
Start: 2024-05-23 | End: 2024-05-24 | Stop reason: HOSPADM

## 2024-05-23 RX ORDER — IBUPROFEN 200 MG
24 TABLET ORAL
Status: DISCONTINUED | OUTPATIENT
Start: 2024-05-23 | End: 2024-05-24 | Stop reason: HOSPADM

## 2024-05-23 RX ORDER — ASPIRIN 325 MG
325 TABLET ORAL
Status: DISPENSED | OUTPATIENT
Start: 2024-05-23

## 2024-05-23 RX ORDER — FENTANYL CITRATE 50 UG/ML
INJECTION, SOLUTION INTRAMUSCULAR; INTRAVENOUS
Status: DISCONTINUED | OUTPATIENT
Start: 2024-05-23 | End: 2024-05-23 | Stop reason: HOSPADM

## 2024-05-23 RX ORDER — GLUCAGON 1 MG
1 KIT INJECTION
Status: DISCONTINUED | OUTPATIENT
Start: 2024-05-23 | End: 2024-05-24 | Stop reason: HOSPADM

## 2024-05-23 RX ORDER — SODIUM CHLORIDE 9 MG/ML
INJECTION, SOLUTION INTRAVENOUS CONTINUOUS
Status: DISCONTINUED | OUTPATIENT
Start: 2024-05-23 | End: 2024-05-23

## 2024-05-23 RX ORDER — ASPIRIN 81 MG/1
81 TABLET ORAL DAILY
Status: DISCONTINUED | OUTPATIENT
Start: 2024-05-24 | End: 2024-05-24 | Stop reason: HOSPADM

## 2024-05-23 RX ORDER — ALBUTEROL SULFATE 90 UG/1
2 AEROSOL, METERED RESPIRATORY (INHALATION) EVERY 4 HOURS PRN
Status: DISCONTINUED | OUTPATIENT
Start: 2024-05-23 | End: 2024-05-23 | Stop reason: CLARIF

## 2024-05-23 RX ORDER — IBUPROFEN 200 MG
16 TABLET ORAL
Status: DISCONTINUED | OUTPATIENT
Start: 2024-05-23 | End: 2024-05-24 | Stop reason: HOSPADM

## 2024-05-23 RX ADMIN — ATORVASTATIN CALCIUM 40 MG: 40 TABLET, FILM COATED ORAL at 08:05

## 2024-05-23 RX ADMIN — ASPIRIN 325 MG ORAL TABLET 325 MG: 325 PILL ORAL at 08:05

## 2024-05-23 RX ADMIN — APIXABAN 5 MG: 5 TABLET, FILM COATED ORAL at 08:05

## 2024-05-23 RX ADMIN — DIPHENHYDRAMINE HYDROCHLORIDE 50 MG: 25 CAPSULE ORAL at 08:05

## 2024-05-23 RX ADMIN — CARVEDILOL 25 MG: 25 TABLET, FILM COATED ORAL at 08:05

## 2024-05-23 RX ADMIN — SACUBITRIL AND VALSARTAN 1 TABLET: 97; 103 TABLET, FILM COATED ORAL at 08:05

## 2024-05-23 RX ADMIN — SODIUM CHLORIDE: 9 INJECTION, SOLUTION INTRAVENOUS at 08:05

## 2024-05-23 RX ADMIN — SODIUM CHLORIDE: 9 INJECTION, SOLUTION INTRAVENOUS at 01:05

## 2024-05-23 RX ADMIN — DIAZEPAM 10 MG: 5 TABLET ORAL at 08:05

## 2024-05-23 NOTE — Clinical Note
The catheter was reinserted into the left ventricle. Hemodynamics were performed.  and Pullback was recorded.

## 2024-05-23 NOTE — Clinical Note
The catheter was repositioned into the ostium   left main. An angiography was performed of the left coronary arteries. The angiography was performed via power injection. The injected amount was 10 mL contrast at 4 mL/s. The PSI from the power injection was 600.

## 2024-05-23 NOTE — Clinical Note
The catheter was repositioned into the ostium   left main. An angiography was performed of the left coronary arteries. Multiple views were taken. The angiography was performed via power injection. The injected amount was 10 mL contrast at 4 mL/s. The PSI from the power injection was 600. Additional images.

## 2024-05-23 NOTE — H&P
[facility]/Surg    History & Physical      Patient Name: Lakesha Caballero  MRN: [id number]  Admission Date: 5/23/2024  Attending Physician: Francisco Cruz MD  Primary Care Provider: Layne Daniel FNP-C         Patient information was obtained from patient and ER records.     Subjective:     Principal Problem:<principal problem not specified>    Chief Complaint: No chief complaint on file.       HPI:   63 year old woman with history of DM2, HTN, HLD, atrial fibrillation, CHF, atrial fibrillation, hx of dVT (on eliquis) presented following heart cath. Patient had heart cath done on 5/23/2024 and admitted for overnight monitoring. No blockages on heart cath. Patient had procedure done given abnormal stress tests. Currently stable.     Past Medical History:   Diagnosis Date    Abnormal cardiovascular stress test     Anxiety disorder, unspecified     Asthma     CAD (coronary artery disease)     CHF (congestive heart failure)     Chronic atrial fibrillation     Chronic pain     Chronic venous insufficiency     Diabetes mellitus, type 2     Dizziness     DVT, lower extremity     history left leg    Dyslipidemia (high LDL; low HDL)     Edema of leg     Hyperkalemia     Hyperlipidemia     Hypertension     Left ventricular systolic dysfunction     Low back pain     MDD (major depressive disorder)     Mild mitral valve regurgitation     Smoker     SOB (shortness of breath)     Venous insufficiency of both lower extremities        Past Surgical History:   Procedure Laterality Date    CARDIAC CATHETERIZATION      EYE SURGERY Bilateral 2021    HYSTERECTOMY         Review of patient's allergies indicates:  No Known Allergies    No current facility-administered medications on file prior to encounter.     Current Outpatient Medications on File Prior to Encounter   Medication Sig    acetaminophen (TYLENOL) 650 MG TbSR Take 1,300 mg by mouth every 8 (eight) hours as needed.    albuterol (PROVENTIL/VENTOLIN HFA) 90  mcg/actuation inhaler Inhale 2 puffs into the lungs every 4 (four) hours as needed for Shortness of Breath or Wheezing.    alcohol swabs (ALCOHOL PREP PADS) PadM USE 1 TIME DAILY    atorvastatin (LIPITOR) 40 MG tablet Take 40 mg by mouth every evening.    blood sugar diagnostic (TRUE METRIX GLUCOSE TEST STRIP) Strp 1 strip by Misc.(Non-Drug; Combo Route) route 2 (two) times a day.    carvediloL (COREG) 25 MG tablet Take 1 tablet (25 mg total) by mouth 2 (two) times daily.    ibuprofen-diphenhydrAMINE cit (IBUPROFEN PM) 200-38 mg Tab Take 2 tablets by mouth nightly.    isosorbide mononitrate (IMDUR) 30 MG 24 hr tablet Take 1 tablet (30 mg total) by mouth once daily.    lancets (ONETOUCH DELICA PLUS LANCET) 33 gauge Misc USE 1 TIME A DAY AS        DIRECTED    lancets 18 gauge Misc 1 lancet by Misc.(Non-Drug; Combo Route) route 2 (two) times a day.    sacubitriL-valsartan (ENTRESTO)  mg per tablet Take 1 tablet by mouth 2 (two) times daily.    apixaban (ELIQUIS) 5 mg Tab Take 1 tablet (5 mg total) by mouth 2 (two) times a day.    aspirin (ECOTRIN) 81 MG EC tablet Take 81 mg by mouth once daily.    metFORMIN (GLUCOPHAGE) 500 MG tablet Take 500 mg by mouth every evening.    semaglutide (OZEMPIC) 0.25 mg or 0.5 mg (2 mg/3 mL) pen injector Inject into the skin every 7 days.    semaglutide (OZEMPIC) 1 mg/dose (4 mg/3 mL) Inject 1 mg into the skin every 7 days.     Family History       Problem Relation (Age of Onset)    Alcohol abuse Brother    Brain cancer Brother    Diabetes type II Brother    Heart disease Mother    Kidney failure Brother          Tobacco Use    Smoking status: Former     Current packs/day: 0.00     Types: Cigarettes     Quit date: 2022     Years since quittin.5    Smokeless tobacco: Never   Substance and Sexual Activity    Alcohol use: Yes     Alcohol/week: 4.0 standard drinks of alcohol     Types: 4 Glasses of wine per week     Comment: 2-3 glasses wine every 2 weeks    Drug use: Not  Currently    Sexual activity: Not Currently     Partners: Female     Birth control/protection: None     Review of Systems   All other systems reviewed and are negative.    Objective:     Vital Signs (Most Recent):  Temp: 97.6 °F (36.4 °C) (05/23/24 1513)  Pulse: 82 (05/23/24 1513)  Resp: 18 (05/23/24 1513)  BP: (!) 105/59 (05/23/24 1513)  SpO2: 97 % (05/23/24 1513) Vital Signs (24h Range):  Temp:  [97.4 °F (36.3 °C)-97.6 °F (36.4 °C)] 97.6 °F (36.4 °C)  Pulse:  [60-95] 82  Resp:  [10-20] 18  SpO2:  [95 %-99 %] 97 %  BP: (105-153)/() 105/59     Weight: 112.7 kg (248 lb 8 oz)  Body mass index is 40.11 kg/m².    Physical Exam  Vitals reviewed. Exam conducted with a chaperone present.   Constitutional:       Appearance: Normal appearance. She is normal weight.   HENT:      Head: Normocephalic and atraumatic.      Nose: Nose normal.      Mouth/Throat:      Mouth: Mucous membranes are moist.      Pharynx: Oropharynx is clear.   Eyes:      Conjunctiva/sclera: Conjunctivae normal.      Pupils: Pupils are equal, round, and reactive to light.   Cardiovascular:      Rate and Rhythm: Normal rate and regular rhythm.      Pulses: Normal pulses.   Pulmonary:      Effort: Pulmonary effort is normal.   Abdominal:      General: Abdomen is flat. Bowel sounds are normal.   Musculoskeletal:         General: Normal range of motion.      Cervical back: Normal range of motion.   Skin:     General: Skin is warm.   Neurological:      General: No focal deficit present.      Mental Status: She is alert. Mental status is at baseline.           CRANIAL NERVES     CN III, IV, VI   Pupils are equal, round, and reactive to light.      Significant Labs: All pertinent labs within the past 24 hours have been reviewed.  Recent Lab Results         05/23/24  0805        POCT Glucose 118               Significant Imaging: I have reviewed all pertinent imaging results/findings within the past 24 hours.  I have reviewed and interpreted all pertinent  imaging results/findings within the past 24 hours.    Assessment/Plan:     There are no hospital problems to display for this patient.    VTE Risk Mitigation (From admission, onward)           Ordered     apixaban tablet 5 mg  2 times daily         05/23/24 1637     IP VTE HIGH RISK PATIENT  Once         05/23/24 1457     Place sequential compression device  Until discontinued         05/23/24 1457                  *abnormal stress test  - LHC done by Dr. Baxter on 5/23 showed no blockages  - Monitor overnight  - Cardiac monitoring    *HTN  *CHF  - Resumed home entresto    *Atrial Fibrillation  *Hx of DVT  - Resumed home eliquis    *HLD  - Resumed statin    *DM2  - ISS while in house, goal glucose 140-180    *CAD  - Resumed isosorbide    Code Status: Full Code  Diet: Heart health  DVT PPX: Eliquis    Services provided via two way audio/visual telecommunication  Provider location: [place], [place]  Patient location: CYNTHIA Burk MD  Department of Hospital Medicine   [facility]/Surg

## 2024-05-23 NOTE — DISCHARGE SUMMARY
[facility]/EP  Discharge Note  Short Stay    Procedure(s) (LRB):  ANGIOGRAM, CORONARY ARTERY (Right)      OUTCOME: Patient tolerated treatment/procedure well without complication and is now ready for discharge.    DISPOSITION: Home or Self Care    FINAL DIAGNOSIS:  CAD    FOLLOWUP: In clinic    DISCHARGE INSTRUCTIONS:  radial precautions    TIME SPENT ON DISCHARGE: 35 minutes

## 2024-05-23 NOTE — Clinical Note
The catheter was repositioned into the ostium   right coronary artery. An angiography was performed of the right coronary arteries. Multiple views were taken. The angiography was performed via power injection. The injected amount was 10 mL contrast at 4 mL/s. The PSI from the power injection was 600.

## 2024-05-23 NOTE — Clinical Note
The DP pulses were 1+ bilaterally. The PT pulses were 1+ bilaterally. The radial pulses were +1 and allens test positive bilaterally.

## 2024-05-24 VITALS
OXYGEN SATURATION: 98 % | TEMPERATURE: 98 F | RESPIRATION RATE: 20 BRPM | HEIGHT: 66 IN | SYSTOLIC BLOOD PRESSURE: 139 MMHG | HEART RATE: 65 BPM | BODY MASS INDEX: 39.94 KG/M2 | DIASTOLIC BLOOD PRESSURE: 79 MMHG | WEIGHT: 248.5 LBS

## 2024-05-24 DIAGNOSIS — E11.59 TYPE 2 DIABETES MELLITUS WITH OTHER CIRCULATORY COMPLICATION, WITHOUT LONG-TERM CURRENT USE OF INSULIN: Primary | ICD-10-CM

## 2024-05-24 LAB
ALBUMIN SERPL-MCNC: 3.8 G/DL (ref 3.4–5)
ALBUMIN/GLOB SERPL: 1.4 RATIO
ALP SERPL-CCNC: 95 UNIT/L (ref 50–144)
ALT SERPL-CCNC: 18 UNIT/L (ref 1–45)
ANION GAP SERPL CALC-SCNC: 8 MEQ/L (ref 2–13)
AST SERPL-CCNC: 19 UNIT/L (ref 14–36)
BASOPHILS # BLD AUTO: 0.04 X10(3)/MCL (ref 0.01–0.08)
BASOPHILS NFR BLD AUTO: 0.9 % (ref 0.1–1.2)
BILIRUB SERPL-MCNC: 0.5 MG/DL (ref 0–1)
BUN SERPL-MCNC: 11 MG/DL (ref 7–20)
CALCIUM SERPL-MCNC: 9.4 MG/DL (ref 8.4–10.2)
CHLORIDE SERPL-SCNC: 106 MMOL/L (ref 98–110)
CO2 SERPL-SCNC: 24 MMOL/L (ref 21–32)
CREAT SERPL-MCNC: 0.94 MG/DL (ref 0.66–1.25)
CREAT/UREA NIT SERPL: 12 (ref 12–20)
EOSINOPHIL # BLD AUTO: 0.21 X10(3)/MCL (ref 0.04–0.36)
EOSINOPHIL NFR BLD AUTO: 4.7 % (ref 0.7–7)
ERYTHROCYTE [DISTWIDTH] IN BLOOD BY AUTOMATED COUNT: 15.1 % (ref 11–14.5)
GFR SERPLBLD CREATININE-BSD FMLA CKD-EPI: 67 ML/MIN/1.73/M2
GLOBULIN SER-MCNC: 2.8 GM/DL (ref 2–3.9)
GLUCOSE SERPL-MCNC: 116 MG/DL (ref 70–115)
HCT VFR BLD AUTO: 34.7 % (ref 36–48)
HGB BLD-MCNC: 11.6 G/DL (ref 11.8–16)
IMM GRANULOCYTES # BLD AUTO: 0.01 X10(3)/MCL (ref 0–0.03)
IMM GRANULOCYTES NFR BLD AUTO: 0.2 % (ref 0–0.5)
LYMPHOCYTES # BLD AUTO: 1.71 X10(3)/MCL (ref 1.16–3.74)
LYMPHOCYTES NFR BLD AUTO: 38.2 % (ref 20–55)
MCH RBC QN AUTO: 26.2 PG (ref 27–34)
MCHC RBC AUTO-ENTMCNC: 33.4 G/DL (ref 31–37)
MCV RBC AUTO: 78.5 FL (ref 79–99)
MONOCYTES # BLD AUTO: 0.51 X10(3)/MCL (ref 0.24–0.36)
MONOCYTES NFR BLD AUTO: 11.4 % (ref 4.7–12.5)
NEUTROPHILS # BLD AUTO: 2 X10(3)/MCL (ref 1.56–6.13)
NEUTROPHILS NFR BLD AUTO: 44.6 % (ref 37–73)
NRBC BLD AUTO-RTO: 0 %
PLATELET # BLD AUTO: 345 X10(3)/MCL (ref 140–371)
PMV BLD AUTO: 10.9 FL (ref 9.4–12.4)
POCT GLUCOSE: 107 MG/DL (ref 70–110)
POCT GLUCOSE: 98 MG/DL (ref 70–110)
POTASSIUM SERPL-SCNC: 4.3 MMOL/L (ref 3.5–5.1)
PROT SERPL-MCNC: 6.6 GM/DL (ref 6.3–8.2)
RBC # BLD AUTO: 4.42 X10(6)/MCL (ref 4–5.1)
SODIUM SERPL-SCNC: 138 MMOL/L (ref 136–145)
WBC # SPEC AUTO: 4.48 X10(3)/MCL (ref 4–11.5)

## 2024-05-24 PROCEDURE — 36415 COLL VENOUS BLD VENIPUNCTURE: CPT | Performed by: INTERNAL MEDICINE

## 2024-05-24 PROCEDURE — 94761 N-INVAS EAR/PLS OXIMETRY MLT: CPT

## 2024-05-24 PROCEDURE — G0378 HOSPITAL OBSERVATION PER HR: HCPCS

## 2024-05-24 PROCEDURE — 25000003 PHARM REV CODE 250: Performed by: INTERNAL MEDICINE

## 2024-05-24 PROCEDURE — 85025 COMPLETE CBC W/AUTO DIFF WBC: CPT | Performed by: INTERNAL MEDICINE

## 2024-05-24 PROCEDURE — 80053 COMPREHEN METABOLIC PANEL: CPT | Performed by: INTERNAL MEDICINE

## 2024-05-24 RX ORDER — SEMAGLUTIDE 1.34 MG/ML
INJECTION, SOLUTION SUBCUTANEOUS
Qty: 9 ML | Refills: 3 | Status: SHIPPED | OUTPATIENT
Start: 2024-05-24 | End: 2024-06-05 | Stop reason: ALTCHOICE

## 2024-05-24 RX ADMIN — ASPIRIN 81 MG: 81 TABLET, COATED ORAL at 09:05

## 2024-05-24 RX ADMIN — APIXABAN 5 MG: 5 TABLET, FILM COATED ORAL at 09:05

## 2024-05-24 NOTE — PROGRESS NOTES
[facility]/Surg    Cardiology  Progress Note    Patient Name: Lakesha Caballero  MRN: [id number]  Admission Date: 5/23/2024  Hospital Length of Stay: 0 days  Code Status: Full Code   Attending Physician: Ari Lorenz MD   Primary Care Physician: Layne Daniel FNP-C  Expected Discharge Date: 5/24/2024  Principal Problem:<principal problem not specified>    Subjective:     Brief HPI/Hospital Course: elective LHC due to abnormal PET    HPI:  63-year-old patient with past medical history of coronary artery disease, hypertension, atrial fibrillation, left ventricular systolic dysfunction, smoker, diabetes, hypertension, history of DVT in depression.  She underwent a cardiac PET scan due to complaints of worsening shortness of breath revealing a fixed apical defect and decreased myocardial blood flow reserve.  She was scheduled for coronary angiogram which was performed yesterday revealing a tortuous LAD with diffuse 20-30% stenosis in the mid segment and a focal 20-30% stenosis in the mid RCA.  She was kept overnight for observation.    This morning she denies complaints of chest pain shortness of breath or right wrist pain.  Her only concern is lack of dependable transportation for her appointments.    Previous Diagnostics  PET 2.21.24  The study quality is good.   This is probably a normal perfusion study. There is no evidence of ischemia.   This scan is suggestive of low risk for future cardiovascular events.   Small fixed perfusion abnormality of mild intensity in the apical inferior segment which is more pronounced on rest images. Improves on stress imaging. Good contractility of this segment. Likely represents artifact (not infarct).   The left ventricular cavity is noted to be normal on the stress studies. The stress left ventricular ejection fraction was calculated to be 52% and left ventricular global function is normal. The rest left ventricular cavity is noted to be normal. The rest left  ventricular ejection fraction was calculated to be 41% and rest left ventricular global function is mildly reduced.   When compared to the resting ejection fraction (41%), the stress ejection fraction (52%) has increased.   There was a rise in myocardial blood flow between rest and stress.  Global myocardial blood flow reserve was 2.65.  Myocardial blood flow reserve is reduced in the apical territory which is suggestive of flow limiting stenosis in this territory.    Echo 8.8.22  -The study quality is good.   -The left ventricle is normal in size. Global left ventricular systolic function is normal. The left ventricular ejection fraction is 55%. Left ventricular diastolic function is indeterminate. Right ventricular systolic function is moderately decreased.  -Normal RV size and function.  -Mild calcification of the aortic valve is noted with adequate cuspal excursion.  Mild mitral annular calcification is noted.   -Mild to moderate (1-2+) mitral regurgitation. Mild (1+) tricuspid regurgitation. Mild (1+) pulmonic regurgitation.    Review of Systems   Constitutional: Negative.   HENT: Negative.     Cardiovascular: Negative.    Respiratory: Negative.     Hematologic/Lymphatic: Negative.    Skin: Negative.    Musculoskeletal: Negative.      Objective:     Vital Signs (Most Recent):  Temp: 97.8 °F (36.6 °C) (05/24/24 0724)  Pulse: 67 (05/24/24 0724)  Resp: 20 (05/24/24 0724)  BP: (!) 96/55 (05/24/24 0724)  SpO2: 98 % (05/24/24 0724) Vital Signs (24h Range):  Temp:  [97.4 °F (36.3 °C)-98.6 °F (37 °C)] 97.8 °F (36.6 °C)  Pulse:  [56-95] 67  Resp:  [10-20] 20  SpO2:  [94 %-99 %] 98 %  BP: ()/() 96/55   Weight: 112.7 kg (248 lb 8 oz)  Body mass index is 40.11 kg/m².  SpO2: 98 %       Intake/Output Summary (Last 24 hours) at 5/24/2024 6468  Last data filed at 5/24/2024 0611  Gross per 24 hour   Intake 1946.67 ml   Output --   Net 1946.67 ml     Lines/Drains/Airways       Peripheral Intravenous Line  Duration                   Peripheral IV - Single Lumen 05/23/24 0800 20 G Left Antecubital 1 day                  Significant Labs:   Recent Results (from the past 72 hour(s))   POCT glucose    Collection Time: 05/23/24  8:05 AM   Result Value Ref Range    POCT Glucose 118 (H) 70 - 110 mg/dL   POCT glucose    Collection Time: 05/23/24  5:04 PM   Result Value Ref Range    POCT Glucose 98 70 - 110 mg/dL   POCT glucose    Collection Time: 05/23/24  7:36 PM   Result Value Ref Range    POCT Glucose 129 (H) 70 - 110 mg/dL   Comprehensive metabolic panel    Collection Time: 05/24/24  4:57 AM   Result Value Ref Range    Sodium 138 136 - 145 mmol/L    Potassium 4.3 3.5 - 5.1 mmol/L    Chloride 106 98 - 110 mmol/L    CO2 24 21 - 32 mmol/L    Glucose 116 (H) 70 - 115 mg/dL    Blood Urea Nitrogen 11 7.0 - 20.0 mg/dL    Creatinine 0.94 0.66 - 1.25 mg/dL    Calcium 9.4 8.4 - 10.2 mg/dL    Protein Total 6.6 6.3 - 8.2 gm/dL    Albumin 3.8 3.4 - 5.0 g/dL    Globulin 2.8 2.0 - 3.9 gm/dL    Albumin/Globulin Ratio 1.4 ratio    Bilirubin Total 0.5 0.0 - 1.0 mg/dL    ALP 95 50 - 144 unit/L    ALT 18 1 - 45 unit/L    AST 19 14 - 36 unit/L    eGFR 67 mL/min/1.73/m2    Anion Gap 8.0 2.0 - 13.0 mEq/L    BUN/Creatinine Ratio 12 12 - 20   CBC with Differential    Collection Time: 05/24/24  4:57 AM   Result Value Ref Range    WBC 4.48 4.00 - 11.50 x10(3)/mcL    RBC 4.42 4.00 - 5.10 x10(6)/mcL    Hgb 11.6 (L) 11.8 - 16.0 g/dL    Hct 34.7 (L) 36.0 - 48.0 %    MCV 78.5 (L) 79.0 - 99.0 fL    MCH 26.2 (L) 27.0 - 34.0 pg    MCHC 33.4 31.0 - 37.0 g/dL    RDW 15.1 (H) 11.0 - 14.5 %    Platelet 345 140 - 371 x10(3)/mcL    MPV 10.9 9.4 - 12.4 fL    Neut % 44.6 37 - 73 %    Lymph % 38.2 20 - 55 %    Mono % 11.4 4.7 - 12.5 %    Eos % 4.7 0.7 - 7 %    Basophil % 0.9 0.1 - 1.2 %    Lymph # 1.71 1.16 - 3.74 x10(3)/mcL    Neut # 2.00 1.56 - 6.13 x10(3)/mcL    Mono # 0.51 (H) 0.24 - 0.36 x10(3)/mcL    Eos # 0.21 0.04 - 0.36 x10(3)/mcL    Baso # 0.04 0.01 - 0.08  x10(3)/mcL    IG# 0.01 0.0001 - 0.031 x10(3)/mcL    IG% 0.2 0 - 0.5 %    NRBC% 0.0 <=1 %     Telemetry:  afib 80  Physical Exam  Constitutional:       General: She is not in acute distress.     Appearance: She is obese.   HENT:      Mouth/Throat:      Mouth: Mucous membranes are moist.   Eyes:      Extraocular Movements: Extraocular movements intact.   Cardiovascular:      Rate and Rhythm: Normal rate. Rhythm irregular.      Comments: Right radial pulse intact and hand well perfused  Pulmonary:      Effort: Pulmonary effort is normal.   Abdominal:      Palpations: Abdomen is soft.   Musculoskeletal:      Right lower leg: No edema.      Left lower leg: No edema.   Neurological:      General: No focal deficit present.      Mental Status: She is alert and oriented to person, place, and time.   Psychiatric:         Mood and Affect: Mood normal.         Behavior: Behavior normal.       Current Inpatient Medications:    Current Facility-Administered Medications:     acetaminophen tablet 650 mg, 650 mg, Oral, Q8H PRN, Francisco Cruz MD    albuterol nebulizer solution 2.5 mg, 2.5 mg, Nebulization, Q4H PRN, Ari Lorenz MD    apixaban tablet 5 mg, 5 mg, Oral, BID, Francisco Cruz MD, 5 mg at 05/23/24 2037    aspirin EC tablet 81 mg, 81 mg, Oral, Daily, Francisco Cruz MD    aspirin tablet 325 mg, 325 mg, Oral, On Call Procedure, Sahil Farr MD, 325 mg at 05/23/24 0846    atorvastatin tablet 40 mg, 40 mg, Oral, QHS, Francisco Cruz MD, 40 mg at 05/23/24 2037    carvediloL tablet 25 mg, 25 mg, Oral, BID, Francisco Cruz MD, 25 mg at 05/23/24 2037    dextrose 10% bolus 125 mL 125 mL, 12.5 g, Intravenous, PRN, Francisco Cruz MD    dextrose 10% bolus 250 mL 250 mL, 25 g, Intravenous, PRN, Francisco Cruz MD    diazePAM tablet 10 mg, 10 mg, Oral, On Call Procedure, Sahil Farr MD, 10 mg at 05/23/24 0847    diphenhydrAMINE capsule 50 mg, 50 mg, Oral, On Call Procedure, Saihl Farr MD, 50 mg  at 05/23/24 0847    glucagon (human recombinant) injection 1 mg, 1 mg, Intramuscular, PRN, Francisco Cruz MD    glucose chewable tablet 16 g, 16 g, Oral, PRN, Francisco Cruz MD    glucose chewable tablet 24 g, 24 g, Oral, PRN, Francisco Cruz MD    insulin aspart U-100 pen 0-10 Units, 0-10 Units, Subcutaneous, QID (AC + HS) PRN, Francisco Cruz MD    isosorbide mononitrate 24 hr tablet 30 mg, 30 mg, Oral, Daily, Francisco Cruz MD    melatonin tablet 6 mg, 6 mg, Oral, Nightly PRN, Francisco Cruz MD    ondansetron injection 4 mg, 4 mg, Intravenous, Q8H PRN, Francisco Cruz MD    sacubitriL-valsartan  mg per tablet 1 tablet, 1 tablet, Oral, BID, Francisco Cruz MD, 1 tablet at 05/23/24 2037    sodium chloride 0.9% flush 10 mL, 10 mL, Intravenous, PRN, Francisco Cruz MD  VTE Risk Mitigation (From admission, onward)           Ordered     apixaban tablet 5 mg  2 times daily         05/23/24 1637     IP VTE HIGH RISK PATIENT  Once         05/23/24 1457     Place sequential compression device  Until discontinued         05/23/24 1457                Results for orders placed during the hospital encounter of 05/23/24    Cardiac catheterization    Conclusion    Tortuous LAD with diffuse 20-30% stenosis in the mid segment    Focal 20-30% stenosis in the mid RCA    Right dominant system    Normal LVEDP    The procedure log was verified by Sahil Farr MD.    Date: 5/23/2024  Time: 10:47 AM    Procedure:  Selective coronary angiography  Left heart catheterization  Moderate (Conscious) Sedation      Indication:  Abnormal stress test.  Known CAD.    Consent: The patient was brought to the cardiac catheterization lab. Was instructed and explained about the risk, benefit and alternatives of the procedure included but not limited to sudden cardiac death, myocardial infarction, bleeding, vascular injury, renal failure, stroke, contrast allergy, risk of conscious sedation and need for emergent  bypass surgery.  the patient was agreeable to proceed.  Signed the consent form.    Access:  The patient was prepped using the usual sterile fashion.  Right radial artery  was accessed with micropuncture technique, ultrasound guidance.  An image of the ultrasound was put in the paper chart for purpose of documentation.  Sheath size:6F    Tl test:  Verified with pulse oximetry deemed to be adequate for radial artery procedure.    Diagnostic catheters :  5 Emir[place] tiger catheter.  Using the same 5 [place] tiger catheter we cross the aortic valve and performed left heart catheterization.    Coronary findings:    Dominance: right  Left main:  Short left main with no obstructive disease with <10% epicardial stenosis  Left anterior descending artery:  Tortuous vessel with diffuse 20-30% disease in the mid LAD.  Distal LAD free of any obstructive disease.  Diagonal branch is free of any obstructive disease  Circumflex artery: no obstructive disease with <10% epicardial stenosis  Right coronary artery:  Large dominant vessel with focal 20-30% stenosis in the mid RCA.  Diffuse luminal irregularities in the PLB and PDA.    Hemodynamics: LV/AO no significant gradient                LVEDP = 15 mmHg    Access Closure :  At the end of the procedure the sheath was removed. A TR band applied to the right radial artery . Excellent hemostasis achieved.    Impression  Tortuous LAD with diffuse 20-30% stenosis in the mid segment  Focal 20-30% stenosis in the mid RCA  Right dominant system  Normal LVEDP    Plan  Aggressive risk factor modification  Monitor right radial access site  IV fluid hydration    Sahil Farr MD   Assessment:   Nonobstructive CAD by [facility] 5.23.24    Tortuous LAD with diffuse 20-30% stenosis in the        mid segment    Focal 20-30% stenosis in the mid RCA    Right dominant system    Normal LVEDP  Hx LVSD (EF 40%) recovered    Hyperkalemia with Aldactone, frequent yeast         infections with jardiance  Permanent  Atrial fibrillation, CVR    On Eliquis  Hx DVT  HTN  HLP  DM        Plan:   Ok to allow home  Follow up in office as previously scheduled          LARON Lopez  Cardiology  [facility]  05/24/2024

## 2024-05-24 NOTE — PLAN OF CARE
Problem: Adult Inpatient Plan of Care  Goal: Plan of Care Review  Outcome: Progressing  Goal: Patient-Specific Goal (Individualized)  Outcome: Progressing  Goal: Absence of Hospital-Acquired Illness or Injury  Outcome: Progressing  Goal: Optimal Comfort and Wellbeing  Outcome: Progressing  Goal: Readiness for Transition of Care  Outcome: Progressing     Problem: Diabetes Comorbidity  Goal: Blood Glucose Level Within Targeted Range  Outcome: Progressing     Problem: Bariatric Environmental Safety  Goal: Safety Maintained with Care  Outcome: Progressing     Problem: Wound  Goal: Optimal Coping  Outcome: Progressing  Goal: Optimal Functional Ability  Outcome: Progressing  Goal: Absence of Infection Signs and Symptoms  Outcome: Progressing  Goal: Improved Oral Intake  Outcome: Progressing  Goal: Optimal Pain Control and Function  Outcome: Progressing  Goal: Skin Health and Integrity  Outcome: Progressing  Goal: Optimal Wound Healing  Outcome: Progressing     Problem: Fall Injury Risk  Goal: Absence of Fall and Fall-Related Injury  Outcome: Progressing     Problem: Cardiac Catheterization (Diagnostic/Interventional)  Goal: Absence of Bleeding  Outcome: Progressing  Goal: Absence of Contrast-Induced Injury  Outcome: Progressing  Goal: Stable Heart Rate and Rhythm  Outcome: Progressing  Goal: Absence of Embolism Signs and Symptoms  Outcome: Progressing  Goal: Anesthesia/Sedation Recovery  Outcome: Progressing  Goal: Optimal Pain Control and Function  Outcome: Progressing  Goal: Absence of Vascular Access Complication  Outcome: Progressing

## 2024-05-24 NOTE — PLAN OF CARE
05/24/24 1320   Final Note   Assessment Type Final Discharge Note   Anticipated Discharge Disposition Home   What phone number can be called within the next 1-3 days to see how you are doing after discharge? [phone]   Hospital Resources/Appts/Education Provided Provided patient/caregiver with written discharge plan information;Appointments scheduled and added to AVS   Post-Acute Status   Discharge Delays None known at this time

## 2024-05-24 NOTE — PLAN OF CARE
Patient states she has difficulty with [employer] transportation at times. Referral made to [facility] for help with transportation

## 2024-05-24 NOTE — DISCHARGE SUMMARY
[facility]/Surg  [facility] Medicine  Discharge Summary      Patient Name: Lakesha Caballero  MRN: [id number]  [facility]: [id number]  Patient Class: OP- Observation  Admission Date: 5/23/2024  Hospital Length of Stay: 0 days  Discharge Date and Time:  05/24/2024 12:48 PM  Attending Physician: Ari Lorenz MD   Discharging Provider: Lore Gama MD  Primary Care Provider: Layne Daniel FNP-C    Primary Care Team: Networked reference to record PCT     HPI:   63 year old woman with history of DM2, HTN, HLD, atrial fibrillation, CHF, atrial fibrillation, hx of dVT (on eliquis) presented following heart cath. Patient had heart cath done on 5/23/2024 and admitted for overnight monitoring. No blockages on heart cath. Patient had procedure done given abnormal stress tests. Currently stable     Procedure(s) (LRB):  ANGIOGRAM, CORONARY ARTERY (Right)  Left heart cath (Left)      Hospital Course:   DISCHARGE SUMMARY: pt admitted after LHC to  for management, BP elevated now improved and she is ready for d/c will f/u with PCP and with [facility] as scheduled     Goals of Care Treatment Preferences:  Code Status: Full Code      Consults:     No new Assessment & Plan notes have been filed under this hospital service since the last note was generated.  Service: Hospital Medicine    Final Active Diagnoses:    Diagnosis Date Noted POA    PRINCIPAL PROBLEM:  CAD (coronary artery disease) [I25.10] 02/28/2023 Yes    Hypertension [I10] 02/28/2023 Yes    Chronic atrial fibrillation [I48.20] 02/28/2023 Yes    Diabetes mellitus, type 2 [E11.9] 02/28/2023 Yes    Hyperlipidemia [E78.5] 02/28/2023 Yes    Left ventricular systolic dysfunction [I51.9] 02/28/2023 Yes      Problems Resolved During this Admission:       Discharged Condition: good    Disposition: Home or Self Care    Follow Up:   Follow-up Information       Di Kurtz FNP Follow up on 5/30/2024.    Specialty: Cardiology  Why: FOLLOW UP WITH DI KURTZ ON 5/30/24 @  11:15 ZARI BAJWA  Contact information:  [address]  [address]  [place] [address]  [phone]                           Patient Instructions:      Activity as tolerated       Significant Diagnostic Studies: Labs: BMP:   Recent Labs   Lab 05/24/24  0457      K 4.3   CO2 24   BUN 11   CREATININE 0.94   CALCIUM 9.4    and CMP   Recent Labs   Lab 05/24/24  0457      K 4.3   CO2 24   BUN 11   CREATININE 0.94   CALCIUM 9.4   ALBUMIN 3.8   BILITOT 0.5   ALKPHOS 95   AST 19   ALT 18       Pending Diagnostic Studies:       None           Medications:  Reconciled Home Medications:      Medication List        CONTINUE taking these medications      acetaminophen 650 MG Tbsr  Commonly known as: TYLENOL  Take 1,300 mg by mouth every 8 (eight) hours as needed.     albuterol 90 mcg/actuation inhaler  Commonly known as: PROVENTIL/VENTOLIN HFA  Inhale 2 puffs into the lungs every 4 (four) hours as needed for Shortness of Breath or Wheezing.     ALCOHOL PREP PADS Padm  Generic drug: alcohol swabs  USE 1 TIME DAILY     apixaban 5 mg Tab  Commonly known as: ELIQUIS  Take 1 tablet (5 mg total) by mouth 2 (two) times a day.     aspirin 81 MG EC tablet  Commonly known as: ECOTRIN  Take 81 mg by mouth once daily.     atorvastatin 40 MG tablet  Commonly known as: LIPITOR  Take 40 mg by mouth every evening.     blood sugar diagnostic Strp  Commonly known as: TRUE METRIX GLUCOSE TEST STRIP  1 strip by Misc.(Non-Drug; Combo Route) route 2 (two) times a day.     carvediloL 25 MG tablet  Commonly known as: COREG  Take 1 tablet (25 mg total) by mouth 2 (two) times daily.     ENTRESTO  mg per tablet  Generic drug: sacubitriL-valsartan  Take 1 tablet by mouth 2 (two) times daily.     IBUPROFEN -38 mg Tab  Generic drug: ibuprofen-diphenhydrAMINE cit  Take 2 tablets by mouth nightly.     isosorbide mononitrate 30 MG 24 hr tablet  Commonly known as: IMDUR  Take 1 tablet (30 mg total) by mouth once daily.     * ONETOUCH  DELICA PLUS LANCET 33 gauge Misc  Generic drug: lancets  USE 1 TIME A DAY AS        DIRECTED     * lancets 18 gauge Misc  1 lancet by Misc.(Non-Drug; Combo Route) route 2 (two) times a day.     metFORMIN 500 MG tablet  Commonly known as: GLUCOPHAGE  Take 500 mg by mouth every evening.           * This list has 2 medication(s) that are the same as other medications prescribed for you. Read the directions carefully, and ask your doctor or other care provider to review them with you.                ASK your doctor about these medications      * semaglutide 0.25 mg or 0.5 mg (2 mg/3 mL) pen injector  Commonly known as: OZEMPIC  Inject into the skin every 7 days.  Ask about: Which instructions should I use?     * OZEMPIC 1 mg/dose (4 mg/3 mL)  Generic drug: semaglutide  INJECT 1MG SUBCUTANEOUSLY EVERY 7 DAYS  Ask about: Which instructions should I use?           * This list has 2 medication(s) that are the same as other medications prescribed for you. Read the directions carefully, and ask your doctor or other care provider to review them with you.                  Indwelling Lines/Drains at time of discharge:   Lines/Drains/Airways       None                   Time spent on the discharge of patient: 37 minutes  Patient Screened for food insecurity, housing instability, transportation needs, utility difficulties, and interpersonal safety.  Social Consult for: Transportation Needs     Physical Exam  Constitutional:       General: She is not in acute distress.     Appearance: Normal appearance. She is normal weight. She is not ill-appearing.   Cardiovascular:      Rate and Rhythm: Normal rate and regular rhythm.      Pulses: Normal pulses.      Heart sounds: Normal heart sounds.   Pulmonary:      Effort: Pulmonary effort is normal.      Breath sounds: Normal breath sounds.   Abdominal:      General: Abdomen is flat.      Palpations: Abdomen is soft.   Skin:     General: Skin is warm and dry.      Findings: No erythema,  lesion or rash.   Neurological:      Mental Status: She is alert.   Psychiatric:         Behavior: Behavior normal.      Comments: I had a face to face encounter with this patient prior to d/c           Lore Gama MD  Department of Hospital Medicine  [facility]/Surg

## 2024-05-24 NOTE — HPI
63 year old woman with history of DM2, HTN, HLD, atrial fibrillation, CHF, atrial fibrillation, hx of dVT (on eliquis) presented following heart cath. Patient had heart cath done on 5/23/2024 and admitted for overnight monitoring. No blockages on heart cath. Patient had procedure done given abnormal stress tests. Currently stable

## 2024-05-24 NOTE — HOSPITAL COURSE
DISCHARGE SUMMARY: pt admitted after LHC to  for management, BP elevated now improved and she is ready for d/c will f/u with PCP and with [facility] as scheduled

## 2024-05-24 NOTE — PLAN OF CARE
05/24/24 0902   Discharge Assessment   Assessment Type Discharge Planning Assessment   Confirmed/corrected address, phone number and insurance Yes   Confirmed Demographics Correct on Facesheet   Source of Information patient   When was your last doctors appointment? 05/15/24   Does patient/caregiver understand observation status Yes   Communicated VANESSA with patient/caregiver Yes   Reason For Admission heart cath   People in Home alone;facility resident  (lives in assisted living apartment)   Facility Arrived From: [facility] in [place]   Do you expect to return to your current living situation? Yes   Do you have help at home or someone to help you manage your care at home? Yes   Who are your caregiver(s) and their phone number(s)? assisted living aides   Prior to hospitilization cognitive status: Alert/Oriented   Current cognitive status: Alert/Oriented   Walking or Climbing Stairs Difficulty yes   Walking or Climbing Stairs ambulation difficulty, requires equipment;transferring difficulty, requires equipment   Mobility Management walker, cane   Dressing/Bathing Difficulty no   Home Layout Able to live on 1st floor   Equipment Currently Used at Home cane, straight;rollator   Readmission within 30 days? No   Patient currently being followed by outpatient case management? No   Do you currently have service(s) that help you manage your care at home? Yes   Name and Contact number of agency [facility]   Is the pt/caregiver preference to resume services with current agency Yes   Do you take prescription medications? Yes   Do you have prescription coverage? Yes   Coverage [employer]   Do you have any problems affording any of your prescribed medications? No   Is the patient taking medications as prescribed? yes   Who is going to help you get home at discharge? cousin   How do you get to doctors appointments? health plan transportation   Are you on dialysis? No   Do you take  coumadin? No   Discharge Plan A Independent living facility   DME Needed Upon Discharge  none   Discharge Plan discussed with: Patient   Transition of Care Barriers None   Physical Activity   On average, how many days per week do you engage in moderate to strenuous exercise (like a brisk walk)? 0 days   On average, how many minutes do you engage in exercise at this level? 0 min   Financial Resource Strain   How hard is it for you to pay for the very basics like food, housing, medical care, and heating? Not very   Housing Stability   In the last 12 months, was there a time when you were not able to pay the mortgage or rent on time? N   At any time in the past 12 months, were you homeless or living in a shelter (including now)? N   Transportation Needs   Has the lack of transportation kept you from medical appointments, meetings, work or from getting things needed for daily living? No   Food Insecurity   Within the past 12 months, you worried that your food would run out before you got the money to buy more. Never true   Within the past 12 months, the food you bought just didn't last and you didn't have money to get more. Never true   Stress   Do you feel stress - tense, restless, nervous, or anxious, or unable to sleep at night because your mind is troubled all the time - these days? Only a littl   Social Isolation   How often do you feel lonely or isolated from those around you?  Sometimes   Alcohol Use   Q1: How often do you have a drink containing alcohol? Never   Q2: How many drinks containing alcohol do you have on a typical day when you are drinking? None   Q3: How often do you have six or more drinks on one occasion? Never   Utilities   In the past 12 months has the electric, gas, oil, or water company threatened to shut off services in your home? No   Health Literacy   How often do you need to have someone help you when you read instructions, pamphlets, or other written material from your doctor or pharmacy?  Sometimes

## 2024-05-27 ENCOUNTER — PATIENT OUTREACH (OUTPATIENT)
Dept: ADMINISTRATIVE | Facility: CLINIC | Age: 64
End: 2024-05-27
Payer: COMMERCIAL

## 2024-05-27 NOTE — PROGRESS NOTES
C3 nurse spoke with Lakesha Caballero  for a TCC post hospital discharge follow up call. The patient has a scheduled HOSFU appointment with Layne Daniel FNP-C 5/28/24 @7:30am

## 2024-05-29 DIAGNOSIS — E11.59 TYPE 2 DIABETES MELLITUS WITH OTHER CIRCULATORY COMPLICATION, WITHOUT LONG-TERM CURRENT USE OF INSULIN: Primary | ICD-10-CM

## 2024-05-30 RX ORDER — METFORMIN HYDROCHLORIDE 500 MG/1
500 TABLET, EXTENDED RELEASE ORAL NIGHTLY
Qty: 90 TABLET | Refills: 3 | Status: SHIPPED | OUTPATIENT
Start: 2024-05-30

## 2024-06-04 NOTE — INTERVAL H&P NOTE
The patient has been examined and the H&P has been reviewed:    I concur with the findings and no changes have occurred since H&P was written.    Procedure risks, benefits and alternative options discussed and understood by patient/family.    Active Hospital Problems    Diagnosis  POA    *CAD (coronary artery disease) [I25.10]  Yes    Hypertension [I10]  Yes    Chronic atrial fibrillation [I48.20]  Yes    Diabetes mellitus, type 2 [E11.9]  Yes    Hyperlipidemia [E78.5]  Yes    Left ventricular systolic dysfunction [I51.9]  Yes      Resolved Hospital Problems   No resolved problems to display.

## 2024-06-05 ENCOUNTER — OFFICE VISIT (OUTPATIENT)
Dept: FAMILY MEDICINE | Facility: CLINIC | Age: 64
End: 2024-06-05
Payer: COMMERCIAL

## 2024-06-05 ENCOUNTER — TELEPHONE (OUTPATIENT)
Dept: FAMILY MEDICINE | Facility: CLINIC | Age: 64
End: 2024-06-05

## 2024-06-05 VITALS
HEIGHT: 66 IN | WEIGHT: 246 LBS | BODY MASS INDEX: 39.53 KG/M2 | SYSTOLIC BLOOD PRESSURE: 128 MMHG | HEART RATE: 82 BPM | OXYGEN SATURATION: 97 % | TEMPERATURE: 98 F | DIASTOLIC BLOOD PRESSURE: 82 MMHG

## 2024-06-05 DIAGNOSIS — I25.118 CORONARY ARTERY DISEASE OF NATIVE HEART WITH STABLE ANGINA PECTORIS, UNSPECIFIED VESSEL OR LESION TYPE: Primary | ICD-10-CM

## 2024-06-05 DIAGNOSIS — I87.2 PERIPHERAL VENOUS INSUFFICIENCY: ICD-10-CM

## 2024-06-05 DIAGNOSIS — R06.09 DOE (DYSPNEA ON EXERTION): ICD-10-CM

## 2024-06-05 DIAGNOSIS — E78.5 HYPERLIPIDEMIA, UNSPECIFIED HYPERLIPIDEMIA TYPE: ICD-10-CM

## 2024-06-05 DIAGNOSIS — I38 VHD (VALVULAR HEART DISEASE): ICD-10-CM

## 2024-06-05 DIAGNOSIS — I42.8 NONISCHEMIC CARDIOMYOPATHY: ICD-10-CM

## 2024-06-05 DIAGNOSIS — Z86.718 HISTORY OF DEEP VENOUS THROMBOSIS: ICD-10-CM

## 2024-06-05 DIAGNOSIS — I10 HYPERTENSION, UNSPECIFIED TYPE: ICD-10-CM

## 2024-06-05 DIAGNOSIS — E11.59 TYPE 2 DIABETES MELLITUS WITH OTHER CIRCULATORY COMPLICATION, WITHOUT LONG-TERM CURRENT USE OF INSULIN: ICD-10-CM

## 2024-06-05 DIAGNOSIS — I48.20 CHRONIC ATRIAL FIBRILLATION: ICD-10-CM

## 2024-06-05 PROCEDURE — 4010F ACE/ARB THERAPY RXD/TAKEN: CPT | Mod: ,,, | Performed by: NURSE PRACTITIONER

## 2024-06-05 PROCEDURE — 99214 OFFICE O/P EST MOD 30 MIN: CPT | Mod: ,,, | Performed by: NURSE PRACTITIONER

## 2024-06-05 PROCEDURE — 1159F MED LIST DOCD IN RCRD: CPT | Mod: ,,, | Performed by: NURSE PRACTITIONER

## 2024-06-05 PROCEDURE — 3066F NEPHROPATHY DOC TX: CPT | Mod: ,,, | Performed by: NURSE PRACTITIONER

## 2024-06-05 PROCEDURE — 3008F BODY MASS INDEX DOCD: CPT | Mod: ,,, | Performed by: NURSE PRACTITIONER

## 2024-06-05 PROCEDURE — 3044F HG A1C LEVEL LT 7.0%: CPT | Mod: ,,, | Performed by: NURSE PRACTITIONER

## 2024-06-05 PROCEDURE — 1160F RVW MEDS BY RX/DR IN RCRD: CPT | Mod: ,,, | Performed by: NURSE PRACTITIONER

## 2024-06-05 PROCEDURE — 3079F DIAST BP 80-89 MM HG: CPT | Mod: ,,, | Performed by: NURSE PRACTITIONER

## 2024-06-05 PROCEDURE — 3060F POS MICROALBUMINURIA REV: CPT | Mod: ,,, | Performed by: NURSE PRACTITIONER

## 2024-06-05 PROCEDURE — 3074F SYST BP LT 130 MM HG: CPT | Mod: ,,, | Performed by: NURSE PRACTITIONER

## 2024-06-05 RX ORDER — TIRZEPATIDE 5 MG/.5ML
5 INJECTION, SOLUTION SUBCUTANEOUS
Qty: 6 ML | Refills: 0 | Status: SHIPPED | OUTPATIENT
Start: 2024-06-05 | End: 2024-09-03

## 2024-06-05 RX ORDER — ISOSORBIDE MONONITRATE 30 MG/1
30 TABLET, EXTENDED RELEASE ORAL DAILY
Qty: 90 TABLET | Refills: 3 | Status: SHIPPED | OUTPATIENT
Start: 2024-06-05

## 2024-06-05 NOTE — PROGRESS NOTES
Patient ID: [id number]     Chief Complaint: Medication Refill      HPI:     Lakesha Caballero is a 63 y.o. female in the office for Medication Refill    She's following up from hospital - Patient had heart cath done on 5/23/2024 and admitted for overnight monitoring. No blockages on heart cath. Patient had procedure done given abnormal stress tests.     She has already followed up with [facility] on 5/30/24. No med changes and she'll follow up in 6 months. She plans to start walking.     Past Medical History:  has a past medical history of Abnormal cardiovascular stress test, Anxiety disorder, unspecified, Asthma, CAD (coronary artery disease), CHF (congestive heart failure), Chronic atrial fibrillation, Chronic pain, Chronic venous insufficiency, Diabetes mellitus, type 2, Dizziness, DVT, lower extremity, Dyslipidemia (high LDL; low HDL), Edema of leg, Hyperkalemia, Hyperlipidemia, Hypertension, Left ventricular systolic dysfunction, Low back pain, MDD (major depressive disorder), Mild mitral valve regurgitation, Smoker, SOB (shortness of breath), and Venous insufficiency of both lower extremities.    Social History:  reports that she quit smoking about 19 months ago. Her smoking use included cigarettes. She has never used smokeless tobacco. She reports current alcohol use of about 4.0 standard drinks of alcohol per week. She reports that she does not currently use drugs.    Current Outpatient Medications   Medication Instructions    acetaminophen (TYLENOL) 1,300 mg, Oral, Every 8 hours PRN    alcohol swabs (ALCOHOL PREP PADS) PadM USE 1 TIME DAILY    apixaban (ELIQUIS) 5 mg, Oral, 2 times daily    aspirin (ECOTRIN) 81 mg, Oral, Daily    atorvastatin (LIPITOR) 40 mg, Oral, Nightly    blood sugar diagnostic (TRUE METRIX GLUCOSE TEST STRIP) Strp 1 strip, Misc.(Non-Drug; Combo Route), 2 times daily    carvediloL (COREG) 25 mg, Oral, 2 times daily    ibuprofen-diphenhydrAMINE cit (IBUPROFEN PM) 200-38 mg Tab 2 tablets,  "Oral, Nightly    isosorbide mononitrate (IMDUR) 30 mg, Oral, Daily    lancets (ONETOUCH DELICA PLUS LANCET) 33 gauge Misc USE 1 TIME A DAY AS        DIRECTED    lancets 18 gauge Misc 1 lancet , Misc.(Non-Drug; Combo Route), 2 times daily    metFORMIN (GLUCOPHAGE-XR) 500 mg, Oral, Nightly    MOUNJARO 5 mg, Subcutaneous, Every 7 days    sacubitriL-valsartan (ENTRESTO)  mg per tablet 1 tablet, Oral, 2 times daily       Patient has No Known Allergies.     Patient Care Team:  Layne Daniel FNP-C as PCP - General (Family Medicine)  Sahil Farr MD as Consulting Physician (Cardiology)  Kei Lantigua OD as Consulting Physician (Optometry)     Subjective     Review of Systems    See HPI     Objective     Visit Vitals  /82 (BP Location: Left arm)   Pulse 82   Temp 98.2 °F (36.8 °C) (Temporal)   Ht 5' 6" (1.676 m)   Wt 111.6 kg (246 lb)   SpO2 97%   BMI 39.71 kg/m²       Physical Exam  Vitals reviewed.   Constitutional:       General: She is not in acute distress.     Appearance: She is obese. She is not ill-appearing.   HENT:      Head: Normocephalic.      Right Ear: External ear normal.      Left Ear: External ear normal.      Nose: Nose normal.      Mouth/Throat:      Mouth: Mucous membranes are moist.      Pharynx: Oropharynx is clear.   Cardiovascular:      Rate and Rhythm: Normal rate and regular rhythm.      Heart sounds: Normal heart sounds.   Pulmonary:      Effort: Pulmonary effort is normal.      Breath sounds: Normal breath sounds.   Abdominal:      General: Bowel sounds are normal.      Palpations: Abdomen is soft.      Tenderness: There is no abdominal tenderness.   Musculoskeletal:         General: Normal range of motion.      Cervical back: Normal range of motion and neck supple.   Skin:     General: Skin is warm and dry.   Neurological:      Mental Status: She is alert and oriented to person, place, and time. Mental status is at baseline.   Psychiatric:         Mood and " Affect: Mood normal.         Behavior: Behavior normal.         Assessment & Plan:     1. Coronary artery disease of native heart with stable angina pectoris, unspecified vessel or lesion type  Overview:  Mild disease 11/2020.  Worsening shortness of breath, 30% mid RCA stenosis.  Abnormal PET 4/24 with apical defect with decreased myocardial blood flow reserve in the apical territory suggestive of flow-limiting stenosis.  Currently on 2 antianginals.  Coronary angiogram with tortuous LAD with diffuse 20-30% stenosis in the mid segment, focal 20-30% stenosis in the mid RCA.    Assessment & Plan:  Continue medications above.  Keep all follow ups with cardiology.      2. Hypertension, unspecified type  -     isosorbide mononitrate (IMDUR) 30 MG 24 hr tablet; Take 1 tablet (30 mg total) by mouth once daily.  Dispense: 90 tablet; Refill: 3    3. Chronic atrial fibrillation  Overview:  On eliquis      4. Nonischemic cardiomyopathy  Overview:  NYHA 2 stable.  (Hyperkalemia with Aldactone, intolerant to Jardiance.)  Ejection fraction improved to 55% 08/2022.  Tolerating Entresto and Coreg well.      5. VHD (valvular heart disease)  Overview:  Mild-moderate MR      6. Peripheral venous insufficiency  Overview:  Conservative management per Dr. Blackman      7. History of deep venous thrombosis  Overview:  2015, unclear if provoked      8. Hyperlipidemia, unspecified hyperlipidemia type  Overview:  On statin and LDL at goal      9. Type 2 diabetes mellitus with other circulatory complication, without long-term current use of insulin  Overview:  On metformin, ozempic    Assessment & Plan:  Change Ozempic to Mounjaro    Orders:  -     tirzepatide (MOUNJARO) 5 mg/0.5 mL PnIj; Inject 5 mg into the skin every 7 days.  Dispense: 6 mL; Refill: 0  -     CBC Auto Differential; Future; Expected date: 10/05/2024  -     Comprehensive Metabolic Panel; Future; Expected date: 10/05/2024  -     Hemoglobin A1C; Future; Expected date:  10/05/2024    10. ALEMAN (dyspnea on exertion)       Follow up for 1), 4 mo f/u, non-fasting labs prior. In addition to their next scheduled appointment, the patient has also been instructed to follow up on as needed basis.     Future Appointments   Date Time Provider Department Center   9/30/2024  9:10 AM LAB, [facility] LABORATORY DRAW STATION [facility]   10/7/2024  8:30 AM [facility]   2/4/2025  9:10 AM LAB, [facility] LABORATORY DRAW STATION [facility]   2/11/2025  2:00 PM [facility]

## 2024-06-05 NOTE — TELEPHONE ENCOUNTER
Looked on the  Vendor Registry website and found a list of qualify factors.  The one thing that it does say is that all requirements are determined by in house use.

## 2024-06-05 NOTE — TELEPHONE ENCOUNTER
Spoke with [employer].  They do take [employer] but said that no medicare will pay for a portable anything.  They said they do not offer scooters only electric wheelchairs.

## 2024-06-10 ENCOUNTER — TELEPHONE (OUTPATIENT)
Dept: FAMILY MEDICINE | Facility: CLINIC | Age: 64
End: 2024-06-10
Payer: COMMERCIAL

## 2024-06-21 ENCOUNTER — TELEPHONE (OUTPATIENT)
Dept: FAMILY MEDICINE | Facility: CLINIC | Age: 64
End: 2024-06-21
Payer: COMMERCIAL

## 2024-08-19 ENCOUNTER — TELEPHONE (OUTPATIENT)
Dept: FAMILY MEDICINE | Facility: CLINIC | Age: 64
End: 2024-08-19
Payer: COMMERCIAL

## 2024-08-19 DIAGNOSIS — E11.59 TYPE 2 DIABETES MELLITUS WITH OTHER CIRCULATORY COMPLICATION, WITHOUT LONG-TERM CURRENT USE OF INSULIN: Primary | ICD-10-CM

## 2024-08-19 NOTE — TELEPHONE ENCOUNTER
Spoke with patient, explained that its her cardiologists that she needs to call for that medication, she voiced understanding.

## 2024-08-20 ENCOUNTER — TELEPHONE (OUTPATIENT)
Dept: FAMILY MEDICINE | Facility: CLINIC | Age: 64
End: 2024-08-20
Payer: COMMERCIAL

## 2024-10-01 ENCOUNTER — TELEPHONE (OUTPATIENT)
Dept: FAMILY MEDICINE | Facility: CLINIC | Age: 64
End: 2024-10-01
Payer: COMMERCIAL

## 2024-10-01 NOTE — TELEPHONE ENCOUNTER
Pt stated that she received a text from [facility] saying that they were mailing her prescription so she wanted to make sure it is ok. I explained to her that cardiology prescribes it and because she has a fib she will need to continue taking it. She verbalized understanding.

## 2024-10-22 ENCOUNTER — OFFICE VISIT (OUTPATIENT)
Dept: FAMILY MEDICINE | Facility: CLINIC | Age: 64
End: 2024-10-22
Payer: COMMERCIAL

## 2024-10-22 VITALS
WEIGHT: 249 LBS | SYSTOLIC BLOOD PRESSURE: 122 MMHG | OXYGEN SATURATION: 97 % | HEIGHT: 66 IN | TEMPERATURE: 98 F | BODY MASS INDEX: 40.02 KG/M2 | HEART RATE: 80 BPM | DIASTOLIC BLOOD PRESSURE: 72 MMHG

## 2024-10-22 DIAGNOSIS — E11.59 TYPE 2 DIABETES MELLITUS WITH OTHER CIRCULATORY COMPLICATION, WITHOUT LONG-TERM CURRENT USE OF INSULIN: Primary | ICD-10-CM

## 2024-10-22 DIAGNOSIS — M54.2 CERVICALGIA: ICD-10-CM

## 2024-10-22 DIAGNOSIS — M25.512 ACUTE PAIN OF LEFT SHOULDER: ICD-10-CM

## 2024-10-22 LAB
ALBUMIN SERPL-MCNC: 4.3 G/DL (ref 3.4–5)
ALBUMIN/GLOB SERPL: 1.4 RATIO
ALP SERPL-CCNC: 100 UNIT/L (ref 50–144)
ALT SERPL-CCNC: 17 UNIT/L (ref 1–45)
ANION GAP SERPL CALC-SCNC: 7 MEQ/L (ref 2–13)
AST SERPL-CCNC: 20 UNIT/L (ref 14–36)
BASOPHILS # BLD AUTO: 0.03 X10(3)/MCL (ref 0.01–0.08)
BASOPHILS NFR BLD AUTO: 0.7 % (ref 0.1–1.2)
BILIRUB SERPL-MCNC: 0.5 MG/DL (ref 0–1)
BUN SERPL-MCNC: 12 MG/DL (ref 7–20)
CALCIUM SERPL-MCNC: 10.2 MG/DL (ref 8.4–10.2)
CHLORIDE SERPL-SCNC: 106 MMOL/L (ref 98–110)
CHOLEST SERPL-MCNC: 135 MG/DL (ref 0–200)
CO2 SERPL-SCNC: 29 MMOL/L (ref 21–32)
CREAT SERPL-MCNC: 0.88 MG/DL (ref 0.66–1.25)
CREAT/UREA NIT SERPL: 14 (ref 12–20)
EOSINOPHIL # BLD AUTO: 0.21 X10(3)/MCL (ref 0.04–0.36)
EOSINOPHIL NFR BLD AUTO: 5 % (ref 0.7–7)
ERYTHROCYTE [DISTWIDTH] IN BLOOD BY AUTOMATED COUNT: 14.1 % (ref 11–14.5)
EST. AVERAGE GLUCOSE BLD GHB EST-MCNC: 108.3 MG/DL (ref 70–115)
GFR SERPLBLD CREATININE-BSD FMLA CKD-EPI: 73 ML/MIN/1.73/M2
GLOBULIN SER-MCNC: 3.1 GM/DL (ref 2–3.9)
GLUCOSE SERPL-MCNC: 114 MG/DL (ref 70–115)
HBA1C MFR BLD: 5.4 % (ref 4–6)
HCT VFR BLD AUTO: 36.7 % (ref 36–48)
HDLC SERPL-MCNC: 52 MG/DL (ref 40–60)
HGB BLD-MCNC: 12.4 G/DL (ref 11.8–16)
IMM GRANULOCYTES # BLD AUTO: 0.01 X10(3)/MCL (ref 0–0.03)
IMM GRANULOCYTES NFR BLD AUTO: 0.2 % (ref 0–0.5)
LDLC SERPL DIRECT ASSAY-SCNC: 56.9 MG/DL (ref 30–100)
LYMPHOCYTES # BLD AUTO: 1.67 X10(3)/MCL (ref 1.16–3.74)
LYMPHOCYTES NFR BLD AUTO: 40 % (ref 20–55)
MCH RBC QN AUTO: 26.2 PG (ref 27–34)
MCHC RBC AUTO-ENTMCNC: 33.8 G/DL (ref 31–37)
MCV RBC AUTO: 77.4 FL (ref 79–99)
MONOCYTES # BLD AUTO: 0.41 X10(3)/MCL (ref 0.24–0.36)
MONOCYTES NFR BLD AUTO: 9.8 % (ref 4.7–12.5)
NEUTROPHILS # BLD AUTO: 1.84 X10(3)/MCL (ref 1.56–6.13)
NEUTROPHILS NFR BLD AUTO: 44.3 % (ref 37–73)
NRBC BLD AUTO-RTO: 0 %
PLATELET # BLD AUTO: 365 X10(3)/MCL (ref 140–371)
PMV BLD AUTO: 12.1 FL (ref 9.4–12.4)
POTASSIUM SERPL-SCNC: 5.1 MMOL/L (ref 3.5–5.1)
PROT SERPL-MCNC: 7.4 GM/DL (ref 6.3–8.2)
RBC # BLD AUTO: 4.74 X10(6)/MCL (ref 4–5.1)
SODIUM SERPL-SCNC: 142 MMOL/L (ref 136–145)
TRIGL SERPL-MCNC: 100 MG/DL (ref 30–200)
TSH SERPL-ACNC: 1.83 UIU/ML (ref 0.36–3.74)
WBC # BLD AUTO: 4.17 X10(3)/MCL (ref 4–11.5)

## 2024-10-22 PROCEDURE — 80053 COMPREHEN METABOLIC PANEL: CPT | Performed by: NURSE PRACTITIONER

## 2024-10-22 PROCEDURE — 80061 LIPID PANEL: CPT | Performed by: NURSE PRACTITIONER

## 2024-10-22 PROCEDURE — 3078F DIAST BP <80 MM HG: CPT | Mod: ,,, | Performed by: NURSE PRACTITIONER

## 2024-10-22 PROCEDURE — 3008F BODY MASS INDEX DOCD: CPT | Mod: ,,, | Performed by: NURSE PRACTITIONER

## 2024-10-22 PROCEDURE — 1125F AMNT PAIN NOTED PAIN PRSNT: CPT | Mod: ,,, | Performed by: NURSE PRACTITIONER

## 2024-10-22 PROCEDURE — 85025 COMPLETE CBC W/AUTO DIFF WBC: CPT | Performed by: NURSE PRACTITIONER

## 2024-10-22 PROCEDURE — 84443 ASSAY THYROID STIM HORMONE: CPT | Performed by: NURSE PRACTITIONER

## 2024-10-22 PROCEDURE — 83036 HEMOGLOBIN GLYCOSYLATED A1C: CPT | Performed by: NURSE PRACTITIONER

## 2024-10-22 PROCEDURE — 3074F SYST BP LT 130 MM HG: CPT | Mod: ,,, | Performed by: NURSE PRACTITIONER

## 2024-10-22 PROCEDURE — 99214 OFFICE O/P EST MOD 30 MIN: CPT | Mod: ,,, | Performed by: NURSE PRACTITIONER

## 2024-10-22 RX ORDER — SEMAGLUTIDE 0.68 MG/ML
0.5 INJECTION, SOLUTION SUBCUTANEOUS
Qty: 3 ML | Refills: 0 | Status: SHIPPED | OUTPATIENT
Start: 2024-10-22 | End: 2024-11-15

## 2024-10-22 RX ORDER — SEMAGLUTIDE 1.34 MG/ML
1 INJECTION, SOLUTION SUBCUTANEOUS
Qty: 9 ML | Refills: 3 | Status: SHIPPED | OUTPATIENT
Start: 2024-10-22 | End: 2024-11-15

## 2024-10-22 NOTE — PROGRESS NOTES
Patient ID: [id number]     Chief Complaint: Shoulder Pain and Arm Pain      HPI:     Lakesha Caballero is a 64 y.o. female in the office for Shoulder Pain and Arm Pain    Needs sleep study, she said she's sorry she didn't say yes when she was called to set it up.      Not losing weight.  Would like to get back on Ozempic.  Sent one month supply to [facility], increase to 1 mg in one month.     Labs today so she doesn't have to come back later in the week.        Past Medical History:  has a past medical history of Abnormal cardiovascular stress test, Anxiety disorder, unspecified, Asthma, CAD (coronary artery disease), CHF (congestive heart failure), Chronic atrial fibrillation, Chronic pain, Chronic venous insufficiency, Diabetes mellitus, type 2, Dizziness, DVT, lower extremity, Dyslipidemia (high LDL; low HDL), Edema of leg, Hyperkalemia, Hyperlipidemia, Hypertension, Left ventricular systolic dysfunction, Low back pain, MDD (major depressive disorder), Mild mitral valve regurgitation, Smoker, SOB (shortness of breath), and Venous insufficiency of both lower extremities.    Social History:  reports that she quit smoking about 1 years ago. Her smoking use included cigarettes. She has never used smokeless tobacco. She reports current alcohol use of about 4.0 standard drinks of alcohol per week. She reports that she does not currently use drugs.    Current Outpatient Medications   Medication Instructions    acetaminophen (TYLENOL) 1,300 mg, Every 8 hours PRN    alcohol swabs (ALCOHOL PREP PADS) PadM USE 1 TIME DAILY    apixaban (ELIQUIS) 5 mg, Oral, 2 times daily    aspirin (ECOTRIN) 81 mg, Daily    atorvastatin (LIPITOR) 40 mg, Nightly    blood sugar diagnostic (TRUE METRIX GLUCOSE TEST STRIP) Strp 1 strip, Misc.(Non-Drug; Combo Route), 2 times daily    carvediloL (COREG) 25 mg, Oral, 2 times daily    ibuprofen-diphenhydrAMINE cit (IBUPROFEN PM) 200-38 mg Tab 2 tablets, Nightly    isosorbide mononitrate (IMDUR) 30 mg, Oral,  "Daily    lancets (ONETOUCH DELICA PLUS LANCET) 33 gauge Misc USE 1 TIME A DAY AS        DIRECTED    lancets 18 gauge Misc 1 lancet , 2 times daily    metFORMIN (GLUCOPHAGE-XR) 500 mg, Oral, Nightly    OZEMPIC 0.5 mg, Subcutaneous, Every 7 days    OZEMPIC 1 mg, Subcutaneous, Every 7 days    sacubitriL-valsartan (ENTRESTO)  mg per tablet 1 tablet, Oral, 2 times daily       Patient has No Known Allergies.     Patient Care Team:  Layne Daniel FNP-C as PCP - General (Family Medicine)  Sahil Farr MD as Consulting Physician (Cardiology)  Kei Lantigua OD as Consulting Physician (Optometry)     Subjective     Review of Systems    See HPI     Objective     Visit Vitals  /72 (BP Location: Right arm)   Pulse 80   Temp 98.2 °F (36.8 °C) (Temporal)   Ht 5' 6" (1.676 m)   Wt 112.9 kg (249 lb)   SpO2 97%   BMI 40.19 kg/m²       Physical Exam  Vitals reviewed.   Constitutional:       General: She is not in acute distress.     Appearance: She is obese. She is not ill-appearing.   HENT:      Head: Normocephalic.      Right Ear: External ear normal.      Left Ear: External ear normal.      Nose: Nose normal.      Mouth/Throat:      Mouth: Mucous membranes are moist.      Pharynx: Oropharynx is clear.   Cardiovascular:      Rate and Rhythm: Normal rate and regular rhythm.      Heart sounds: Normal heart sounds.   Pulmonary:      Effort: Pulmonary effort is normal.      Breath sounds: Normal breath sounds.   Abdominal:      General: Bowel sounds are normal.      Palpations: Abdomen is soft.      Tenderness: There is no abdominal tenderness.   Musculoskeletal:      Right shoulder: Tenderness present. Decreased range of motion.      Left shoulder: No tenderness. Normal range of motion.      Cervical back: Normal range of motion and neck supple.   Skin:     General: Skin is warm and dry.   Neurological:      Mental Status: She is alert and oriented to person, place, and time. Mental status is at baseline. "   Psychiatric:         Mood and Affect: Mood normal.         Behavior: Behavior normal.       Assessment & Plan:     1. Type 2 diabetes mellitus with other circulatory complication, without long-term current use of insulin  Overview:  On metformin, ozempic    Orders:  -     semaglutide (OZEMPIC) 0.25 mg or 0.5 mg (2 mg/3 mL) pen injector; Inject 0.5 mg into the skin every 7 days.  Dispense: 3 mL; Refill: 0  -     CBC Auto Differential; Future; Expected date: 10/22/2024  -     Comprehensive Metabolic Panel; Future; Expected date: 10/22/2024  -     Lipid Panel; Future; Expected date: 10/22/2024  -     Hemoglobin A1C; Future; Expected date: 10/22/2024  -     TSH; Future; Expected date: 10/22/2024    2. Cervicalgia  -     X-Ray Cervical Spine AP And Lateral; Future; Expected date: 10/22/2024   Suspected shoulder pain referred.  Given handout on HEP and encouraged to start.  Will call with results and updated POC.     3. Acute pain of left shoulder  -     X-Ray Shoulder 2 or More Views Left; Future; Expected date: 10/22/2024    Other orders  -     semaglutide (OZEMPIC) 1 mg/dose (4 mg/3 mL); Inject 1 mg into the skin every 7 days.  Dispense: 9 mL; Refill: 3       Follow up for move next visit to after november 4, needs dm eye exam. In addition to their next scheduled appointment, the patient has also been instructed to follow up on as needed basis.     Future Appointments   Date Time Provider Department Center   10/22/2024 12:20 PM [facility] XR1 TIAN [facility]   10/22/2024 12:25 PM [facility] XR1 TIAN Andinos [facility]   10/24/2024 10:30 AM LAB, [facility]   11/1/2024  8:30 AM Layne Dnaiel FNP-C [facility]   2/4/2025  9:10 AM LAB, [facility]   2/11/2025  2:00 PM Layne Daniel FNP-C [facility] [place] [facility]

## 2024-10-23 ENCOUNTER — TELEPHONE (OUTPATIENT)
Dept: FAMILY MEDICINE | Facility: CLINIC | Age: 64
End: 2024-10-23
Payer: COMMERCIAL

## 2024-10-25 ENCOUNTER — TELEPHONE (OUTPATIENT)
Dept: FAMILY MEDICINE | Facility: CLINIC | Age: 64
End: 2024-10-25
Payer: COMMERCIAL

## 2024-10-25 DIAGNOSIS — M54.2 CERVICALGIA: Primary | ICD-10-CM

## 2024-10-25 NOTE — TELEPHONE ENCOUNTER
----- Message from MIGUEL Villarreal sent at 10/24/2024  9:38 AM CDT -----  Please inform patient of results:  Degenerative changes in neck and suspected cause of shoulder pain.  Needs PT set up please.

## 2024-10-29 ENCOUNTER — PATIENT MESSAGE (OUTPATIENT)
Dept: FAMILY MEDICINE | Facility: CLINIC | Age: 64
End: 2024-10-29
Payer: COMMERCIAL

## 2024-11-07 ENCOUNTER — OFFICE VISIT (OUTPATIENT)
Dept: FAMILY MEDICINE | Facility: CLINIC | Age: 64
End: 2024-11-07
Payer: COMMERCIAL

## 2024-11-07 VITALS
HEIGHT: 66 IN | HEART RATE: 72 BPM | DIASTOLIC BLOOD PRESSURE: 72 MMHG | WEIGHT: 250 LBS | OXYGEN SATURATION: 99 % | BODY MASS INDEX: 40.18 KG/M2 | TEMPERATURE: 97 F | SYSTOLIC BLOOD PRESSURE: 118 MMHG

## 2024-11-07 DIAGNOSIS — H00.014 HORDEOLUM EXTERNUM OF LEFT UPPER EYELID: Primary | ICD-10-CM

## 2024-11-07 DIAGNOSIS — E11.59 TYPE 2 DIABETES MELLITUS WITH OTHER CIRCULATORY COMPLICATION, WITHOUT LONG-TERM CURRENT USE OF INSULIN: ICD-10-CM

## 2024-11-07 LAB
LEFT EYE DM RETINOPATHY: POSITIVE
RIGHT EYE DM RETINOPATHY: POSITIVE

## 2024-11-07 PROCEDURE — 3008F BODY MASS INDEX DOCD: CPT | Mod: ,,, | Performed by: NURSE PRACTITIONER

## 2024-11-07 PROCEDURE — 1126F AMNT PAIN NOTED NONE PRSNT: CPT | Mod: ,,, | Performed by: NURSE PRACTITIONER

## 2024-11-07 PROCEDURE — 3078F DIAST BP <80 MM HG: CPT | Mod: ,,, | Performed by: NURSE PRACTITIONER

## 2024-11-07 PROCEDURE — 3074F SYST BP LT 130 MM HG: CPT | Mod: ,,, | Performed by: NURSE PRACTITIONER

## 2024-11-07 PROCEDURE — 99214 OFFICE O/P EST MOD 30 MIN: CPT | Mod: ,,, | Performed by: NURSE PRACTITIONER

## 2024-11-07 PROCEDURE — 1159F MED LIST DOCD IN RCRD: CPT | Mod: ,,, | Performed by: NURSE PRACTITIONER

## 2024-11-07 RX ORDER — ERYTHROMYCIN 5 MG/G
OINTMENT OPHTHALMIC EVERY 8 HOURS
Qty: 1 G | Refills: 0 | Status: SHIPPED | OUTPATIENT
Start: 2024-11-07 | End: 2024-11-09

## 2024-11-07 NOTE — PROGRESS NOTES
Patient ID: [id number]     Chief Complaint: Diabetes      HPI:     Lakesha Caballero is a 64 y.o. female in the office for Diabetes    She's going to [facility], less pain in neck and low back.  She likes the shot, Ozempic.  Blood sugar is controlled. She's feeling really good other than the nodule on her left eye.      Past Medical History:  has a past medical history of Abnormal cardiovascular stress test, Anxiety disorder, unspecified, Asthma, CAD (coronary artery disease), CHF (congestive heart failure), Chronic atrial fibrillation, Chronic pain, Chronic venous insufficiency, Diabetes mellitus, type 2, Dizziness, DVT, lower extremity, Dyslipidemia (high LDL; low HDL), Edema of leg, Hyperkalemia, Hyperlipidemia, Hypertension, Left ventricular systolic dysfunction, Low back pain, MDD (major depressive disorder), Mild mitral valve regurgitation, Smoker, SOB (shortness of breath), and Venous insufficiency of both lower extremities.    Social History:  reports that she quit smoking about 2 years ago. Her smoking use included cigarettes. She has never used smokeless tobacco. She reports current alcohol use of about 4.0 standard drinks of alcohol per week. She reports that she does not currently use drugs.    Current Outpatient Medications   Medication Instructions    acetaminophen (TYLENOL) 1,300 mg, Every 8 hours PRN    alcohol swabs (ALCOHOL PREP PADS) PadM USE 1 TIME DAILY    apixaban (ELIQUIS) 5 mg, Oral, 2 times daily    aspirin (ECOTRIN) 81 mg, Daily    atorvastatin (LIPITOR) 40 mg, Nightly    blood sugar diagnostic (TRUE METRIX GLUCOSE TEST STRIP) Strp 1 strip, Misc.(Non-Drug; Combo Route), 2 times daily    carvediloL (COREG) 25 mg, Oral, 2 times daily    erythromycin (ROMYCIN) ophthalmic ointment Left Eye, Every 8 hours    ibuprofen-diphenhydrAMINE cit (IBUPROFEN PM) 200-38 mg Tab 2 tablets, Nightly    isosorbide mononitrate (IMDUR) 30 mg, Oral, Daily    lancets (ONETOUCH DELICA PLUS LANCET) 33 gauge Misc USE 1 TIME A DAY  "AS        DIRECTED    lancets 18 gauge Misc 1 lancet , 2 times daily    metFORMIN (GLUCOPHAGE-XR) 500 mg, Oral, Nightly    OZEMPIC 0.5 mg, Subcutaneous, Every 7 days    OZEMPIC 1 mg, Subcutaneous, Every 7 days    sacubitriL-valsartan (ENTRESTO)  mg per tablet 1 tablet, Oral, 2 times daily       Patient has No Known Allergies.     Patient Care Team:  Layne Daniel FNP-C as PCP - General (Family Medicine)  Sahil Farr MD as Consulting Physician (Cardiology)  Kei Lantigua OD as Consulting Physician (Optometry)     Subjective     Review of Systems    See HPI     Objective     Visit Vitals  /72 (BP Location: Left arm)   Pulse 72   Temp 96.8 °F (36 °C) (Temporal)   Ht 5' 6" (1.676 m)   Wt 113.4 kg (250 lb)   SpO2 99%   BMI 40.35 kg/m²       Physical Exam  Vitals reviewed.   Constitutional:       General: She is not in acute distress.  Eyes:      General:         Left eye: Hordeolum present.  Cardiovascular:      Rate and Rhythm: Normal rate and regular rhythm.      Heart sounds: Normal heart sounds.   Pulmonary:      Effort: Pulmonary effort is normal.      Breath sounds: Normal breath sounds.   Skin:     General: Skin is warm and dry.   Neurological:      Mental Status: She is alert and oriented to person, place, and time.   Psychiatric:         Mood and Affect: Mood normal.         Assessment & Plan:     1. Hordeolum externum of left upper eyelid    2. Type 2 diabetes mellitus with other circulatory complication, without long-term current use of insulin  Overview:  On metformin, ozempic      Other orders  -     erythromycin (ROMYCIN) ophthalmic ointment; Place into the left eye every 8 (eight) hours. for 2 days  Dispense: 1 g; Refill: 0       No change DM meds, stye drops  Follow up for 1), 4 mo, DM. In addition to their next scheduled appointment, the patient has also been instructed to follow up on as needed basis.     Future Appointments   Date Time Provider Department Center   2/4/2025 "  9:10 AM LAB, [facility] [place]   2/11/2025  2:00 PM Layne Daniel FNP-MO [facility]        Lab Frequency Next Occurrence   Ambulatory referral/consult to Sleep Disorders Once 03/26/2024   CBC Auto Differential Once 10/05/2024   Comprehensive Metabolic Panel Once 10/05/2024   Hemoglobin A1C Once 10/05/2024   Ambulatory referral/consult to Physical/Occupational Therapy Once 11/01/2024

## 2024-11-12 ENCOUNTER — TELEPHONE (OUTPATIENT)
Dept: FAMILY MEDICINE | Facility: CLINIC | Age: 64
End: 2024-11-12
Payer: COMMERCIAL

## 2024-11-15 ENCOUNTER — PATIENT OUTREACH (OUTPATIENT)
Dept: ADMINISTRATIVE | Facility: HOSPITAL | Age: 64
End: 2024-11-15
Payer: COMMERCIAL

## 2024-11-15 DIAGNOSIS — E11.59 TYPE 2 DIABETES MELLITUS WITH OTHER CIRCULATORY COMPLICATION, WITHOUT LONG-TERM CURRENT USE OF INSULIN: ICD-10-CM

## 2024-11-15 RX ORDER — SEMAGLUTIDE 0.68 MG/ML
INJECTION, SOLUTION SUBCUTANEOUS
Qty: 3 EACH | Refills: 5 | Status: SHIPPED | OUTPATIENT
Start: 2024-11-15

## 2024-11-15 NOTE — PROGRESS NOTES
Health Maintenance Topic(s) Outreach Outcomes & Actions Taken:    Eye Exam - Outreach Outcomes & Actions Taken  : Diabetic Eye External Records Uploaded, Care Team & History Updated if Applicable       Additional Notes:  EyePACS exam sent to Layne Daniel's review.

## 2024-11-18 ENCOUNTER — TELEPHONE (OUTPATIENT)
Dept: FAMILY MEDICINE | Facility: CLINIC | Age: 64
End: 2024-11-18
Payer: COMMERCIAL

## 2024-11-18 DIAGNOSIS — E11.9 TYPE 2 DIABETES MELLITUS WITHOUT COMPLICATION, WITH LONG-TERM CURRENT USE OF INSULIN: ICD-10-CM

## 2024-11-18 DIAGNOSIS — Z79.4 TYPE 2 DIABETES MELLITUS WITHOUT COMPLICATION, WITH LONG-TERM CURRENT USE OF INSULIN: ICD-10-CM

## 2024-11-25 ENCOUNTER — TELEPHONE (OUTPATIENT)
Dept: FAMILY MEDICINE | Facility: CLINIC | Age: 64
End: 2024-11-25
Payer: COMMERCIAL

## 2024-11-25 DIAGNOSIS — Z79.4 TYPE 2 DIABETES MELLITUS WITHOUT COMPLICATION, WITH LONG-TERM CURRENT USE OF INSULIN: ICD-10-CM

## 2024-11-25 DIAGNOSIS — E11.9 TYPE 2 DIABETES MELLITUS WITHOUT COMPLICATION, WITH LONG-TERM CURRENT USE OF INSULIN: ICD-10-CM

## 2024-11-26 ENCOUNTER — TELEPHONE (OUTPATIENT)
Dept: FAMILY MEDICINE | Facility: CLINIC | Age: 64
End: 2024-11-26
Payer: COMMERCIAL

## 2024-11-26 DIAGNOSIS — Z86.718 HISTORY OF DEEP VENOUS THROMBOSIS: ICD-10-CM

## 2024-11-30 DIAGNOSIS — I48.20 CHRONIC ATRIAL FIBRILLATION: ICD-10-CM

## 2024-12-02 ENCOUNTER — TELEPHONE (OUTPATIENT)
Dept: FAMILY MEDICINE | Facility: CLINIC | Age: 64
End: 2024-12-02
Payer: COMMERCIAL

## 2024-12-02 DIAGNOSIS — Z79.4 TYPE 2 DIABETES MELLITUS WITHOUT COMPLICATION, WITH LONG-TERM CURRENT USE OF INSULIN: Primary | ICD-10-CM

## 2024-12-02 DIAGNOSIS — E11.9 TYPE 2 DIABETES MELLITUS WITHOUT COMPLICATION, WITH LONG-TERM CURRENT USE OF INSULIN: Primary | ICD-10-CM

## 2024-12-02 RX ORDER — LANCETS
EACH MISCELLANEOUS
Qty: 200 EACH | Refills: 3 | Status: SHIPPED | OUTPATIENT
Start: 2024-12-02

## 2024-12-02 RX ORDER — SACUBITRIL AND VALSARTAN 97; 103 MG/1; MG/1
1 TABLET, FILM COATED ORAL 2 TIMES DAILY
Qty: 14 TABLET | Refills: 0 | OUTPATIENT
Start: 2024-12-02

## 2024-12-02 RX ORDER — INSULIN PUMP SYRINGE, 3 ML
EACH MISCELLANEOUS
Qty: 3 EACH | Refills: 3 | Status: SHIPPED | OUTPATIENT
Start: 2024-12-02

## 2024-12-02 NOTE — TELEPHONE ENCOUNTER
Sent new script for the DM supplies insurance preferred. And explained to patient that we dont send her blood thinners her cardiologist does.

## 2024-12-09 ENCOUNTER — OUTSIDE PLACE OF SERVICE (OUTPATIENT)
Dept: PULMONOLOGY | Facility: CLINIC | Age: 64
End: 2024-12-09
Payer: COMMERCIAL

## 2024-12-11 PROCEDURE — 95810 POLYSOM 6/> YRS 4/> PARAM: CPT | Mod: 26,,, | Performed by: INTERNAL MEDICINE

## 2024-12-12 ENCOUNTER — DOCUMENTATION ONLY (OUTPATIENT)
Dept: SLEEP MEDICINE | Facility: HOSPITAL | Age: 64
End: 2024-12-12
Payer: COMMERCIAL

## 2024-12-27 ENCOUNTER — TELEPHONE (OUTPATIENT)
Dept: FAMILY MEDICINE | Facility: CLINIC | Age: 64
End: 2024-12-27
Payer: MEDICAID

## 2024-12-27 DIAGNOSIS — E11.59 TYPE 2 DIABETES MELLITUS WITH OTHER CIRCULATORY COMPLICATION, WITHOUT LONG-TERM CURRENT USE OF INSULIN: ICD-10-CM

## 2025-01-10 DIAGNOSIS — Z00.00 ENCOUNTER FOR ADULT WELLNESS VISIT: Primary | ICD-10-CM

## 2025-01-17 ENCOUNTER — TELEPHONE (OUTPATIENT)
Dept: FAMILY MEDICINE | Facility: CLINIC | Age: 65
End: 2025-01-17
Payer: MEDICAID

## 2025-01-17 DIAGNOSIS — E11.59 TYPE 2 DIABETES MELLITUS WITH OTHER CIRCULATORY COMPLICATION, WITHOUT LONG-TERM CURRENT USE OF INSULIN: ICD-10-CM

## 2025-01-17 RX ORDER — SEMAGLUTIDE 0.68 MG/ML
0.5 INJECTION, SOLUTION SUBCUTANEOUS
Qty: 3 EACH | Refills: 5 | Status: SHIPPED | OUTPATIENT
Start: 2025-01-17

## 2025-01-30 ENCOUNTER — TELEPHONE (OUTPATIENT)
Dept: FAMILY MEDICINE | Facility: CLINIC | Age: 65
End: 2025-01-30
Payer: MEDICAID

## 2025-02-04 ENCOUNTER — TELEPHONE (OUTPATIENT)
Dept: PULMONOLOGY | Facility: CLINIC | Age: 65
End: 2025-02-04
Payer: COMMERCIAL

## 2025-02-04 PROCEDURE — 80053 COMPREHEN METABOLIC PANEL: CPT | Performed by: NURSE PRACTITIONER

## 2025-02-04 PROCEDURE — 85025 COMPLETE CBC W/AUTO DIFF WBC: CPT | Performed by: NURSE PRACTITIONER

## 2025-02-04 PROCEDURE — 83036 HEMOGLOBIN GLYCOSYLATED A1C: CPT | Performed by: NURSE PRACTITIONER

## 2025-02-04 PROCEDURE — 84443 ASSAY THYROID STIM HORMONE: CPT | Performed by: NURSE PRACTITIONER

## 2025-02-04 PROCEDURE — 80061 LIPID PANEL: CPT | Performed by: NURSE PRACTITIONER

## 2025-02-05 ENCOUNTER — TELEPHONE (OUTPATIENT)
Dept: FAMILY MEDICINE | Facility: CLINIC | Age: 65
End: 2025-02-05
Payer: COMMERCIAL

## 2025-02-05 DIAGNOSIS — G47.33 OSA (OBSTRUCTIVE SLEEP APNEA): Primary | ICD-10-CM

## 2025-02-05 NOTE — TELEPHONE ENCOUNTER
Hey friend she needs a titration study ordered from [facility] according to her sleep report but If y'all don't want to handle that or CPAP orders and follow up you can just send us a referral and we will get her scheduled for titration and follow her on apnea accordingly. Either [facility] internal referral or fax [phone] or email referral to me [email or web address]. Dr. Jeffrey only interprets the sleep studies through [facility] but we will follow pts on referral basis he is a pulmonologist and sleep disorder doctor along with critical care and biopsy lung nodules to check for cancer. Thanks a bunch and have a great day!

## 2025-02-11 ENCOUNTER — OFFICE VISIT (OUTPATIENT)
Dept: FAMILY MEDICINE | Facility: CLINIC | Age: 65
End: 2025-02-11
Payer: COMMERCIAL

## 2025-02-11 VITALS
BODY MASS INDEX: 40.5 KG/M2 | TEMPERATURE: 97 F | WEIGHT: 252 LBS | HEART RATE: 86 BPM | SYSTOLIC BLOOD PRESSURE: 122 MMHG | OXYGEN SATURATION: 98 % | HEIGHT: 66 IN | DIASTOLIC BLOOD PRESSURE: 80 MMHG

## 2025-02-11 DIAGNOSIS — Z00.01 ENCOUNTER FOR ROUTINE ADULT HEALTH EXAMINATION WITH ABNORMAL FINDINGS: Primary | ICD-10-CM

## 2025-02-11 DIAGNOSIS — Z12.31 SCREENING MAMMOGRAM FOR BREAST CANCER: ICD-10-CM

## 2025-02-11 DIAGNOSIS — I50.9 CONGESTIVE HEART FAILURE, UNSPECIFIED HF CHRONICITY, UNSPECIFIED HEART FAILURE TYPE: ICD-10-CM

## 2025-02-11 DIAGNOSIS — E66.01 MORBID OBESITY: ICD-10-CM

## 2025-02-11 DIAGNOSIS — Z00.00 ENCOUNTER FOR PREVENTIVE HEALTH EXAMINATION: ICD-10-CM

## 2025-02-11 DIAGNOSIS — I48.20 CHRONIC ATRIAL FIBRILLATION: ICD-10-CM

## 2025-02-11 DIAGNOSIS — Z72.3 LACK OF PHYSICAL ACTIVITY: ICD-10-CM

## 2025-02-11 DIAGNOSIS — Z79.4 TYPE 2 DIABETES MELLITUS WITHOUT COMPLICATION, WITH LONG-TERM CURRENT USE OF INSULIN: ICD-10-CM

## 2025-02-11 DIAGNOSIS — Z91.89 AT RISK FOR KNOWLEDGE DEFICIT: ICD-10-CM

## 2025-02-11 DIAGNOSIS — E11.9 TYPE 2 DIABETES MELLITUS WITHOUT COMPLICATION, WITH LONG-TERM CURRENT USE OF INSULIN: ICD-10-CM

## 2025-02-11 PROCEDURE — 82043 UR ALBUMIN QUANTITATIVE: CPT | Performed by: NURSE PRACTITIONER

## 2025-02-11 RX ORDER — PREDNISOLONE ACETATE 10 MG/ML
1 SUSPENSION/ DROPS OPHTHALMIC 3 TIMES DAILY
COMMUNITY
Start: 2025-01-30 | End: 2025-03-10

## 2025-02-11 RX ORDER — SEMAGLUTIDE 1.34 MG/ML
1 INJECTION, SOLUTION SUBCUTANEOUS
Qty: 12 ML | Refills: 3 | Status: SHIPPED | OUTPATIENT
Start: 2025-02-11 | End: 2025-04-01

## 2025-02-11 NOTE — PATIENT INSTRUCTIONS
Medicare Annual Wellness Visit      Patient Name: Lakesha Caballero  Today's Date: 2/11/2025    Below you will find your 5-10 year Screening Plan Recommendations:  Health Maintenance       Date Due Completion Date    Foot Exam Never done ---    RSV Vaccine (Age 60+ and Pregnant patients) (1 - Risk 60-74 years 1-dose series) Never done ---    Shingles Vaccine (2 of 2) 11/02/2024 9/7/2024    Diabetes Urine Screening 02/05/2025 2/5/2024    Mammogram 02/19/2025 2/19/2024    Colorectal Cancer Screening 06/07/2025 6/7/2022    Hemoglobin A1c 08/04/2025 2/4/2025    High Dose Statin 11/07/2025 11/7/2024    Diabetic Eye Exam 11/07/2025 11/7/2024    Lipid Panel 02/04/2026 2/4/2025    DEXA Scan 02/16/2027 2/16/2024    TETANUS VACCINE 09/07/2034 9/7/2024          Below is your summarized Personalized Prevention Plan that addresses any concerns we discussed today at your visit. Please see attached detailed information specific to your Health Concerns.  Orders Placed This Encounter   Procedures    Microalbumin/Creatinine Ratio, Urine         The following information is provided to all patients.  This information is to help you find additional resources for any problems that may be affecting your health: Living healthy guide: www.[facility].[place].gov      Understanding Diabetes: www.diabetes.org      Eating healthy: www.cdc.gov/healthyweight      CDC home safety checklist: www.cdc.gov/steadi/patient.html      Agency on Aging: www.goea.[place].[facility]      Alcoholics anonymous (AA): www.aa.org      Physical Activity: www.danial.nih.gov/bk5zisx      Tobacco use: www.quitwithusla.org                                 Patient Education       Weight Loss Tips   About this topic   More and more people are concerned about their weight. You can choose from many different programs. The goal of a weight loss program may be to cut down on calories or to lose extra weight through exercise.  Losing weight may mean changing your ideas about food. Going on a  "diet and losing weight does not mean starving yourself. It means cutting down on the amount of food you eat, making healthy food choices, and being active.  General   Ideally, you need to lose 1 to 2 pounds (0.5 to 1 kg) a week for a healthy weight loss. Losing too much weight too fast is not good. When you take in fewer calories, you will lose weight. Your ideal calorie and weight goal depends on your current age, weight, height, and personal goals. Ask your doctor or dietitian what your ideal weight is.  You need to burn 3500 calories to lose 1 pound (0.5 kg). That means cutting out 500 calories every day for 7 days. You can cut out 500 calories per day by eating or drinking fewer calories, burning them through exercise, or doing both. To lose that extra weight and stay healthy:  Take time to exercise.  Exercise regularly. Burn calories with activity and exercise. Exercise can help you lose weight and it also strengthens your muscles. Set a schedule where you will have time to do exercises. With just 30 to 60 minutes of exercise each day, you could burn 500 extra calories. Your metabolism stays elevated for a period of time after exercise.  If you don't have time for a 30 minute workout, try three 10 minute exercises each day.  If you work near your home, walk to work. Walking is a very good form of exercise.  Take a 20 minute walk each day. Walk during your lunch break. Park far away, so you have to walk more.  Take the steps instead of elevators. You will burn more calories this way.  If you have an illness, like diabetes or high blood pressure, ask your doctor how much exercise is right for you.  Choose healthy snacks.  Low calorie healthy snacks are a good thing. They help your blood sugar stay even and prevent you from overeating at meals. Choose a balanced snack, such as a small apple with 2 tablespoons (30 grams) of peanut butter.  Keep in mind, even "low calorie" foods can add up. Just because you choose " low calorie foods does not mean you do not have to count the calories you eat.  Pack a few fresh fruits or a small salad to take to work or school. Avoid buying a snack at the nearest vending machine.  When you feel thirsty, drink water. Water has no calories and is a very good thirst quencher.  Plan healthy meals.  Plan ahead. Keep a diary of foods that are low in calories. You can also make a list of meal plans for your breakfast, lunch, and dinner. Planning ahead will prevent you from eating out at a fast food place or restaurant.  Make a grocery list before shopping so you only buy food you need. Don't go to the store hungry.  Visit a dietitian. This person will help you make meal plans that will help you lose weight.  Add fiber to your meals. Adding fiber helps you to feel full for a longer amount of time.  Take care when eating out.  Choose lower fat and lower calorie meals. Try a seafood, lean meat, or vegetarian entrée.  Share a meal with a friend.  Try a salad and appetizer instead of an entree.  Ask for a to-go box when dinner is served and put half of your meal in it for a later meal.  Have fruit for dessert.  Drink water instead of other high calorie drinks.  Learn not to overeat.  Watch your portions. For example, the recommended serving size of meat is 3 ounces (90 grams). This is the size of a deck of playing cards. Two tablespoons (30 grams) of peanut butter is the size of a ping-pong ball. One medium fruit is the size of a baseball.  Use a smaller plate or glass during dinner for less calorie intake.  Try drinking a glass or two of water before eating. This may make you feel more full and help you to eat less.  Eat slowly. Take at least 30 minutes to eat. This gives time for your brain to tell your stomach you are full. This will help you avoid overeating.  Some people eat smaller meals more often to help not to overeat. If you can eat six small meals, make them healthy and low calorie. If three  meals are best for you, know your calorie level for the day and spread it out into three healthy low calorie meals.     What will the results be?   Losing weight may make you healthier. You also may have more energy for your daily activities. You may lower disease risk. You may also add years to your life.  What changes to diet are needed?   Learn how to read nutrition labels. Know the serving size. Knowing the calories in an item will help you make healthier choices and lose weight.  Keep a diary of the food you eat. This will help you count the calories you are taking in.  Make a menu in advance. This will help you make good choices to include in your diet.  Avoid eating 2 hours before bedtime to allow for digestion. If you eat right before you go to bed, you may also have worse heartburn.  Be sure to count the calories in the things you drink. You may want to stop drinking soda pop, beer, wine, and mixed drinks (alcohol). Some coffee drinks also have a lot of calories in them.  Who should use this diet?   A weight loss diet may be needed for people with a calculated body mass index of 25 and over. This means you are overweight or obese.  Who should not use this diet?   People with BMI of 18.5 or lower should not use this diet. Do not use this diet if your doctor does not recommend weight loss.  What foods are good to eat?   Choose foods that are nutritious. Remember, portion control is key. Even a low calorie food can become high in calories if you have too big of a serving. Here is a list of foods that are good to eat:  Vegetables:   Broccoli  Asparagus  Spinach  Green leafy vegetables  Tomatoes  Onions  Mushrooms  Cucumbers  Zucchini  Lean proteins:   Egg whites  Beans including kidney, navy, black, and chickpeas  Grilled, broiled, or baked skinless chicken breast  Grilled, broiled, or baked skinless turkey breast  Lean beef  Grimes meat  Grilled, broiled, or baked fish  Beans  Nuts, such as almonds, cashews,  and pistachios  Seeds  Whole grains and carbs:   Oatmeal  Brown rice  Sweet potatoes  Cereal  Whole grain bread or pasta  Fruits:   Apples  Grapefruit  Blueberries  Oranges  Bananas  Grapes  Peaches  Pineapple  Strawberries  Dairy:   Fat-free or low-fat milk and cheese  Low fat yogurt  Soy, rice, or almond milk  What foods should be limited or avoided?   Limit or avoid foods that are high in calories like:  Junk foods  Fried foods  Fatty foods  Processed meats  Food with saturated and trans fat  Whole fat dairy products  Butter  Cheese  Ice cream  Food and drinks with a lot of sugar. Some examples are beer, wine, mixed drinks (alcohol), carbonated sodas, cakes, and cookies.  When do I need to call the doctor?   Weakness  Fast heartbeat  Dizziness  Helpful tips   Join a support group and an exercise group. It is much easier to lose weight if you have support and encouragement.  Do not skip meals. If you skip a meal, you most likely will overeat at that next meal.  Eat at the dining room table instead in front of the TV to help monitor intake.  Where can I learn more?   Academy of Nutrition and Dietetics  https://www.eatright.org/health/weight-loss/your-health-and-your-weight/back-to-basics-for-healthy-weight-loss   Centers for Disease Control and Prevention  https://www.cdc.gov/healthyweight/   Weight-Control Information Network  https://www.niddk.nih.gov/health-information/diet-nutrition/changing-habits-better-health   Last Reviewed Date   2021-08-09  Consumer Information Use and Disclaimer   This information is not specific medical advice and does not replace information you receive from your health care provider. This is only a brief summary of general information. It does NOT include all information about conditions, illnesses, injuries, tests, procedures, treatments, therapies, discharge instructions or life-style choices that may apply to you. You must talk with your health care provider for complete  information about your health and treatment options. This information should not be used to decide whether or not to accept your health care providers advice, instructions or recommendations. Only your health care provider has the knowledge and training to provide advice that is right for you.  Copyright   Copyright © 2021 [employer] Inc. and its affiliates and/or licensors. All rights reserved.    Patient Education       Health Risks of a High BMI   About this topic   Your weight and health depend on a few things. Doctors use a method called BMI or body mass index as a tool to learn more about your risk of having health problems. This tool uses your weight and your height to find your BMI.  BMI does not include things like your habits, where you live, family history, or amount of body fat. Some people with a normal BMI are still not healthy. Other people with a high BMI may be healthy. Most of the time, a person with a higher BMI is less healthy and will need more care. Ask your doctor for their view of your total health during a well visit or physical.  Being overweight or having a high BMI can hurt many parts of your body. Learn about your BMI and how your weight changes your health risks. Then you can make changes to keep yourself as healthy as you can.  General   Weighing too much can be very harmful to your body. It can cause many illnesses and can make it hard to move about.  Blood Sugar   You have a greater chance of having high blood sugar or diabetes if you weigh too much. Your body normally makes a hormone called insulin. The insulin allows your body to use the sugar in your blood.  The cells in your body may not be able to use insulin. Then your cells cannot get the sugar from your bloodstream that they need for energy. Your pancreas has to work extra hard to try and make enough insulin to keep your blood sugar healthy.  If you lose weight and exercise, your body is able to control your blood sugar  levels better. You may not need as much insulin to keep your blood sugar levels healthy.  Your Heart   Being overweight makes your heart work harder.  The blood vessels that bring blood to your heart muscle may become narrow or blocked and cause a heart attack or your heart may not pump as well as it should. Your heart rate may not be normal.  Losing weight can lower your chances of having problems with your heart.  High Blood Pressure   Your heart has to work harder to pump blood through a larger body and to make sure all of your cells have the oxygen they need.  As your heart has to work harder, your blood pressure goes up.  Being overweight can also harm your kidneys and this may also raise your blood pressure.  You may be able to lower your blood pressure through weight loss and routine exercise.  Stroke   Strokes are more likely to happen when someone has high blood pressure, heart problems, high blood sugar, or high cholesterol.  Being overweight puts you at a higher risk for all of these health problems. These problems put you at a higher risk for having a stroke.  Losing weight may help lower your blood pressure, which is the biggest risk factor for a stroke.  Cholesterol   Your cholesterol level is likely to be higher if you are overweight.  This can lead to narrowing of the blood vessels in your heart, neck, or other parts of your body. Then you may have chest pain or signs of low blood flow to a certain area. It can also lead to a heart attack or stroke.  Lowering your weight and changing what you eat may change your cholesterol levels.  Your Liver and Kidneys   You are more likely to have certain problems with your liver or kidneys if you have a high BMI.  Fatty liver disease is caused by a buildup of fat in your liver. Then your liver may not work as well as it should.  You are at a higher risk for diabetes if you are overweight. This illness can cause kidney problems.  There is no exact way to treat  fatty liver disease. By losing weight, you may keep your liver from getting any worse and help your liver work better.  By losing weight, you lower your chance of having kidney problems. If you already have kidney problems, weight loss may help keep your disease from getting worse.  Bone and Joint Problems   Weighing too much can put a lot of stress on your joints.  The extra weight may make the cartilage wear away more quickly from your bones. Your cartilage lines the surfaces of your bones to help your joints glide more easily.  When your cartilage is worn, your joints become stiff and sore.  Losing weight and exercising is one of the best ways to treat joint pain and stiffness. It can also ease the stress on your hips, knees, and back.  Cancer Risk   Gaining weight and poor health habits raise your risk for some kinds of cancer.  Healthy eating and exercise may lower your risk for some kinds of cancer.  Sleep   With a high BMI, you may have extra fat around your neck. This can make your airway smaller and put you at risk for a problem called sleep apnea. With this problem, your breathing starts and stops when you are sleeping.  Signs of sleep apnea include snoring, feeling sleepy during the day, and problems with focusing.  Sleep apnea can lead to heart problems such as increased risk for heart disease and arrhythmias like atrial fibrillation.  Losing weight can lower the amount of fat around your neck and ease sleep apnea problems.  Pregnancy   Your weight can affect how often you have a period. It may also make it harder for you to get pregnant. A high BMI can cause problems for both mom and baby.  If you are overweight and pregnant you are at a higher risk for high blood sugar or high blood pressure while you are pregnant.  You are also more likely to have your baby early or to need a C section.  Losing weight before you become pregnant may lower your chance for these problems. If you are pregnant, talk to  your doctor before you try to lose weight.  Depression   You are more likely to suffer from depression or low mood when you have a high BMI.  You may have a low mood because of low self-esteem, lack of activity, lack of sleep, and health problems caused by being overweight.  Losing weight and finding out the reasons you overeat are some of the steps to improve your quality of life and deal with depression.  Where can I learn more?   National [place] of Diabetes and Digestive and Kidney Diseases  https://www.niddk.nih.gov/health-information/weight-management/adult-overweight-obesity/health-risks   Last Reviewed Date   2021-09-15  Consumer Information Use and Disclaimer   This information is not specific medical advice and does not replace information you receive from your health care provider. This is only a brief summary of general information. It does NOT include all information about conditions, illnesses, injuries, tests, procedures, treatments, therapies, discharge instructions or life-style choices that may apply to you. You must talk with your health care provider for complete information about your health and treatment options. This information should not be used to decide whether or not to accept your health care providers advice, instructions or recommendations. Only your health care provider has the knowledge and training to provide advice that is right for you.  Copyright   Copyright © 2021 Up[employer]Date, Inc. and its affiliates and/or licensors. All rights reserved.    Patient Education       Exercise and Aging   General   Exercise can help older adults prevent falls and stay independent longer. Exercise may also help:  You have stronger muscles and bones and better balance  You lose weight and have more energy  Make your heart and lungs stronger  Lower your chance of health problems like heart disease, high blood sugar, or breast or colon cancer  Control conditions like high blood pressure and  diabetes  You manage stress and get better sleep  Your mood, self-esteem, and self-image  How you think, plan, and pay attention  There are four types of exercise: endurance, strength, balance, and flexibility.  Endurance exercises get your heart rate up and keep it up for a while. Most people should get at least 30 minutes of exercise that gets your heart rate up on 5 or more days each week. Walking, swimming, biking, or going up and down stairs are kinds of exercises to get your heart rate up. You do not have to do all 30 minutes at one time. Try doing 10 minutes 3 times a day.  Strength exercises build muscle. Lifting weights or doing knee bends are kinds of strength exercises.  Balance exercises help to prevent falls. Raise up on your toes or stand on one leg as a kind of balance exercise.  Flexibility exercises move your joints through a full range of motion and stretch your muscles. Bend forward in a chair to stretch your back, do stretches, or bend and extend your arms or legs as a kind of flexibility exercise. Try doing 10 minutes twice a week.  Plan to include all these exercise types in your exercise program. Always check with your doctor before you start a new exercise program.  Some people do not like formal exercise classes, but there are many ways to work your muscles each day. Here are some things you can do.  Use steps instead of elevators.  Park far away in parking lots to walk farther.  Use the time while you wait. Do knee bends as you hold onto the kitchen counter while you wait for your coffee to brew.  When you unload groceries, lift the bags or a container of milk a few times to exercise your arms and legs.  Walk the long way when you go somewhere.  After you walk to the bathroom, walk a few laps around the house before sitting down.  Try doing different standing exercises after you wash your hands each time in the bathroom.  Do balance exercises while you brush your teeth.  Do an exercise or  get up and walk during TV commercials.  Don't forget to exercise small muscle groups like your hands, fingers, ankles, toes, and neck. Wiggle, bend, flex, and rotate these joints from left to right and back to front to help to keep these joints flexible.  When do I need to call the doctor?   Stop exercising and talk to your doctor if you have any of these problems:  Dizziness  Shortness of breath  Pain or pressure in the chest, arms, throat, jaw, or back  Nausea or vomiting  Blood clots  Infection  Joint swelling  Open wounds  Recent hip, back, or eye surgery  New problems that start during exercise  Helpful tips   If you have a health problem like heart disease or diabetes, talk with your doctor about the best exercises for you.  Always warm up before you stretch. Heated muscles stretch much easier than cool muscles. Try to walk or bike at an easy pace for a few minutes to warm up your muscles.  Slow your pace again after you exercise to cool down and bring your heart rate down slowly.  Be sure you do not hold your breath when you exercise because it can raise your blood pressure. If you tend to hold your breath, try to count out loud when you exercise.  Never bounce when doing stretches. Use slow and steady motions and hold your stretch for 20 to 30 seconds.  Have a routine. Doing exercises before a meal may be a good way to get into a routine.  Set small goals for yourself when you start to exercise. Use a chart to see how much you are doing. Ask someone to exercise with you.  Exercise may be slightly uncomfortable, but you should not have sharp pains. If you do get sharp pains, stop what you are doing. If the sharp pains continue, call your doctor.  Drink plenty of fluids without caffeine when you exercise and afterwards. Avoid outdoor exercise if it is too cold or too hot.  Wear the right clothes and shoes. Try layers of clothes, so that you can take them off if you get too hot. Shoes should fit well and  support your feet.  Where can I learn more?   National [place] on Aging  https://www.danial.nih.gov/health/publication/exercise-physical-activity/introduction   Last Reviewed Date   2021-03-18  Consumer Information Use and Disclaimer   This information is not specific medical advice and does not replace information you receive from your health care provider. This is only a brief summary of general information. It does NOT include all information about conditions, illnesses, injuries, tests, procedures, treatments, therapies, discharge instructions or life-style choices that may apply to you. You must talk with your health care provider for complete information about your health and treatment options. This information should not be used to decide whether or not to accept your health care providers advice, instructions or recommendations. Only your health care provider has the knowledge and training to provide advice that is right for you.  Copyright   Copyright © 2021 [employer] and its affiliates and/or licensors. All rights reserved.

## 2025-02-11 NOTE — PROGRESS NOTES
"   Family Medicine    Lakesha Caballero is a 64 y.o. female here today for a Medicare Annual Wellness visit and comprehensive Health Risk Assessment.     Subjective   The following components were reviewed and updated:  Medical history  Family History  Social history  Allergies  Current Medications  Immunizations  Health Maintenance  Patient Care Team    Review of Systems  A comprehensive review of systems was conducted and is negative except as noted above.     Objective   Visit Vitals  /80 (BP Location: Left arm)   Pulse 86   Temp 96.8 °F (36 °C) (Temporal)   Ht 5' 6" (1.676 m)   Wt 114.3 kg (252 lb)   SpO2 98%   BMI 40.67 kg/m²       Physical Exam  Vitals reviewed.   Constitutional:       General: She is not in acute distress.     Appearance: She is obese. She is not ill-appearing or toxic-appearing.   HENT:      Head: Normocephalic.      Right Ear: Tympanic membrane, ear canal and external ear normal.      Left Ear: Tympanic membrane, ear canal and external ear normal.      Nose: Nose normal.      Mouth/Throat:      Mouth: Mucous membranes are moist.      Pharynx: Oropharynx is clear.   Cardiovascular:      Rate and Rhythm: Normal rate. Rhythm irregular.   Pulmonary:      Effort: Pulmonary effort is normal. No respiratory distress.      Breath sounds: Normal breath sounds.   Abdominal:      General: Bowel sounds are normal.      Palpations: Abdomen is soft.      Tenderness: There is no abdominal tenderness.   Musculoskeletal:         General: Normal range of motion.      Cervical back: Normal range of motion and neck supple.   Skin:     General: Skin is warm and dry.   Neurological:      Mental Status: She is alert and oriented to person, place, and time. Mental status is at baseline.   Psychiatric:         Mood and Affect: Mood normal.         Behavior: Behavior normal.          Assessment/Plan:  1. Encounter for routine adult health examination with abnormal findings  Overview:  Health Maintenance   Topic " Date Due    Foot Exam  Never done    RSV Vaccine (Age 60+ and Pregnant patients) (1 - Risk 60-74 years 1-dose series) Never done    Colorectal Cancer Screening  06/07/2025    Hemoglobin A1c  08/04/2025    Lipid Panel  02/04/2026    Diabetes Urine Screening  02/11/2026    Mammogram  02/25/2026    Diabetic Eye Exam  04/29/2026    High Dose Statin  05/20/2026    DEXA Scan  02/16/2029    TETANUS VACCINE  09/07/2034    Hepatitis C Screening  Completed    Shingles Vaccine  Completed    Influenza Vaccine  Completed    COVID-19 Vaccine  Completed    Pneumococcal Vaccines (Age 50+)  Completed           2. Type 2 diabetes mellitus without complication, with long-term current use of insulin  Overview:  On metformin, ozempic    Orders:  -     Microalbumin/Creatinine Ratio, Urine  -     CBC Auto Differential; Future; Expected date: 06/11/2025  -     Comprehensive Metabolic Panel; Future; Expected date: 06/11/2025  -     Lipid Panel; Future; Expected date: 06/11/2025  -     Hemoglobin A1C; Future; Expected date: 06/11/2025    3. Encounter for preventive health examination  -     Ambulatory referral/consult to Gynecology; Future; Expected date: 02/18/2025    4. Morbid obesity    5. Lack of physical activity    6. Screening mammogram for breast cancer  -     Mammo Digital Screening Bilat w/ Alberto; Future; Expected date: 02/25/2025    7. At risk for knowledge deficit    8. Congestive heart failure, unspecified HF chronicity, unspecified heart failure type    9. Chronic atrial fibrillation  Overview:  On eliquis. Not able to tolerate Jardiance.    Assessment & Plan:  Stable, continue current medication.        Other orders  -     Discontinue: semaglutide (OZEMPIC) 1 mg/dose (4 mg/3 mL); Inject 1 mg into the skin every 7 days.  Dispense: 12 mL; Refill: 3        A comprehensive HEALTH RISK ASSESSMENT was completed today. Results are summarized below:    There are NO EMOTIONAL/SOCIAL CONCERNS identified on today's screening for Social  Isolation, Depression and Anxiety.    There are NO COGNITIVE FUNCTION CONCERNS identified on today's screening.  The following FUNCTIONAL AND/OR SAFETY CONCERNS were identified on today's screening for Physical Symptoms, Nutritional, Home Safety/Living Situation, Fall Risk, Activities of Daily Living, Independent Activities of Daily Living, Physical Activity,Timed Up and Go test and Whisper test::   *Patient reports she NEEDS ASSISTANCE WITH SOME INDEPENDENT ACTIVITIES OF DAILY LIVING. ( )     The patient reports NO OPIOID PRESCRIPTIONS. This was confirmed through medication reconciliation.    The patient is NOT A TOBACCO USER.  The patient reports NO SIGNIFICANT ALCOHOL USE.     All Questions regarding food, transportation or housing were not answered today.    I provided Lakesha Caballero with a 5-10 year written Screening Schedule per USPSTF age appropriate recommendations and a Personal Prevention Plan based on the results of today's Health Risk Assessment. Education, counseling, and referrals were provided as documented above and can be viewed in the After Visit Summary.    Follow up in about 1 year (around 2/11/2026), or if symptoms worsen or fail to improve, for Medicare AW. In addition to this scheduled follow up, the patient has also been instructed to follow up on as needed basis.

## 2025-02-12 LAB
CREAT UR-MCNC: 76.1 MG/DL (ref 45–106)
MICROALBUMIN UR-MCNC: 85.9 UG/ML
MICROALBUMIN/CREAT RATIO PNL UR: 112.9 MG/GM CR (ref 0–30)

## 2025-02-21 ENCOUNTER — HOSPITAL ENCOUNTER (OUTPATIENT)
Dept: RADIOLOGY | Facility: HOSPITAL | Age: 65
Discharge: HOME | End: 2025-02-21
Attending: NURSE PRACTITIONER
Payer: COMMERCIAL

## 2025-02-21 DIAGNOSIS — Z12.31 SCREENING MAMMOGRAM FOR BREAST CANCER: ICD-10-CM

## 2025-02-21 PROCEDURE — 77063 BREAST TOMOSYNTHESIS BI: CPT | Mod: TC

## 2025-02-25 ENCOUNTER — TELEPHONE (OUTPATIENT)
Dept: FAMILY MEDICINE | Facility: CLINIC | Age: 65
End: 2025-02-25
Payer: COMMERCIAL

## 2025-02-27 ENCOUNTER — RESULTS FOLLOW-UP (OUTPATIENT)
Dept: FAMILY MEDICINE | Facility: CLINIC | Age: 65
End: 2025-02-27
Payer: COMMERCIAL

## 2025-02-27 ENCOUNTER — TELEPHONE (OUTPATIENT)
Dept: PULMONOLOGY | Facility: CLINIC | Age: 65
End: 2025-02-27
Payer: COMMERCIAL

## 2025-02-27 NOTE — TELEPHONE ENCOUNTER
Return call and rescheduled patient. LB  ----- Message from Marely sent at 2/27/2025 11:24 AM CST -----  Contact: self  Type:  Patient Returning CallWho Called:Lakesha Ceballos Left Message for Patient:unsureDoes the patient know what this is regarding?:pt canceled appt by mistake wants to still make apptWould the patient rather a call back or a response via [facility]? [facility] Call Back Number:[phone] Information: n/a

## 2025-02-27 NOTE — TELEPHONE ENCOUNTER
----- Message from MIGUEL Villarreal sent at 2/27/2025  7:10 AM CST -----  Normal MMG. Repeat in 1 year.   ----- Message -----  From: Galilea, Rad Results In  Sent: 2/26/2025  12:11 PM CST  To: MIGUEL Dominique

## 2025-03-10 ENCOUNTER — OFFICE VISIT (OUTPATIENT)
Dept: OBSTETRICS AND GYNECOLOGY | Facility: CLINIC | Age: 65
End: 2025-03-10
Payer: COMMERCIAL

## 2025-03-10 VITALS
BODY MASS INDEX: 38.41 KG/M2 | WEIGHT: 239 LBS | DIASTOLIC BLOOD PRESSURE: 78 MMHG | TEMPERATURE: 97 F | HEIGHT: 66 IN | SYSTOLIC BLOOD PRESSURE: 132 MMHG

## 2025-03-10 DIAGNOSIS — N89.8 VAGINAL ODOR: ICD-10-CM

## 2025-03-10 DIAGNOSIS — Z11.3 SCREENING EXAMINATION FOR STD (SEXUALLY TRANSMITTED DISEASE): ICD-10-CM

## 2025-03-10 DIAGNOSIS — N30.01 ACUTE CYSTITIS WITH HEMATURIA: ICD-10-CM

## 2025-03-10 DIAGNOSIS — Z90.710 H/O TOTAL HYSTERECTOMY: ICD-10-CM

## 2025-03-10 DIAGNOSIS — Z01.419 WELL WOMAN EXAM WITH ROUTINE GYNECOLOGICAL EXAM: Primary | ICD-10-CM

## 2025-03-10 LAB
BACTERIA HYPHAE, POC: NEGATIVE
BILIRUB UR QL STRIP: NEGATIVE
GARDNERELLA VAGINALIS: NEGATIVE
GLUCOSE UR QL STRIP: NEGATIVE
KETONES UR QL STRIP: NEGATIVE
LEUKOCYTE ESTERASE UR QL STRIP: POSITIVE
OTHER MICROSC. OBSERVATIONS: NORMAL
PH, POC UA: 6
POC BACTERIAL VAGINOSIS: NEGATIVE
POC BLOOD, URINE: POSITIVE
POC CLUE CELLS: NEGATIVE
POC NITRATES, URINE: POSITIVE
PROT UR QL STRIP: POSITIVE
SP GR UR STRIP: 1.01 (ref 1–1.03)
TRICHOMONAS, POC: NEGATIVE
UROBILINOGEN UR STRIP-ACNC: 0.2 (ref 0.1–1.1)
YEAST WET PREP: NEGATIVE
YEAST, POC: NEGATIVE

## 2025-03-10 PROCEDURE — 3078F DIAST BP <80 MM HG: CPT | Mod: ,,,

## 2025-03-10 PROCEDURE — 3075F SYST BP GE 130 - 139MM HG: CPT | Mod: ,,,

## 2025-03-10 PROCEDURE — 81003 URINALYSIS AUTO W/O SCOPE: CPT | Mod: QW,RHCUB

## 2025-03-10 PROCEDURE — 1160F RVW MEDS BY RX/DR IN RCRD: CPT | Mod: ,,,

## 2025-03-10 PROCEDURE — 87220 TISSUE EXAM FOR FUNGI: CPT | Mod: RHCUB

## 2025-03-10 PROCEDURE — 3066F NEPHROPATHY DOC TX: CPT | Mod: ,,,

## 2025-03-10 PROCEDURE — 3044F HG A1C LEVEL LT 7.0%: CPT | Mod: ,,,

## 2025-03-10 PROCEDURE — 99387 INIT PM E/M NEW PAT 65+ YRS: CPT | Mod: ,,,

## 2025-03-10 PROCEDURE — 1159F MED LIST DOCD IN RCRD: CPT | Mod: ,,,

## 2025-03-10 PROCEDURE — 1126F AMNT PAIN NOTED NONE PRSNT: CPT | Mod: ,,,

## 2025-03-10 PROCEDURE — 4010F ACE/ARB THERAPY RXD/TAKEN: CPT | Mod: ,,,

## 2025-03-10 PROCEDURE — 3008F BODY MASS INDEX DOCD: CPT | Mod: ,,,

## 2025-03-10 PROCEDURE — 3060F POS MICROALBUMINURIA REV: CPT | Mod: ,,,

## 2025-03-10 RX ORDER — LANCETS 33 GAUGE
EACH MISCELLANEOUS
COMMUNITY
Start: 2025-02-19

## 2025-03-10 RX ORDER — NITROFURANTOIN 25; 75 MG/1; MG/1
100 CAPSULE ORAL 2 TIMES DAILY
Qty: 14 CAPSULE | Refills: 0 | Status: SHIPPED | OUTPATIENT
Start: 2025-03-10 | End: 2025-03-17

## 2025-03-10 NOTE — PROGRESS NOTES
Chief Complaint:   new gyn (C/o vaginal odor)     History of Present Illness:  Lakesha Caballero is a 64 y.o. year old  presents for her well woman exam and establishment of care status post hysterectomy. No vaginal bleeding. She denies having hot flashes, night sweats or vaginal dryness. C/o vaginal odor for a few months. Denies any discharge or pain. Admits to a new sexually partner in the last 4 months.       Gyn History:  Menstrual History  Cycle: No  Menarche Age: 13 years  No Cycle Reason: (!) Surgical  Surgical Reason: hysterectomy    Menopause  Menopause Age: 1 years  Post Menopausal Bleeding: No  Hormone Replacement Therapy: No    Pap History  Last pap date:  (unknown)  History of Abnormal Pap: No  HPV Vaccine Completed: No (0/3)    [place]  Sexually Active: No  STI History: No  Contraception: No    Breast History  Last Breast Imaging Date: Yes  Date: 25 (benign)  History of Abnormal Breast Imaging : No  History of Breast Biopsy: No        Review of Systems:  General/Constitutional: Chills denies. Fatigue/weakness denies. Fever denies. Night sweats denies. Hot flashes denies    Respiratory: Cough denies. Hemoptysis denies. SOB denies. Sputum production denies. Wheezing denies .   Cardiovascular: Chest pain denies. Dizziness denies. Palpitations denies. Swelling in hands/feet denies.                Breast: Dimpling denies. Breast mass denies. Breast pain/tenderness denies. Nipple discharge denies. Puckering denies.  Gastrointestinal: Abdominal pain denies. Blood in stool denies. Constipation denies. Diarrhea denies. Heartburn denies. Nausea denies. Vomiting denies    Genitourinary: Incontinence denies. Blood in urine denies. Frequent urination denies. Painful urination denies. Urinary urgency denies. Nocturia denies    Gynecologic: Irregular menses denies. Heavy bleeding denies. Painful menses denies. Vaginal discharge denies. Vaginal odor admits. Vaginal itching denies. Vaginal lesion denies.  Pelvic pain denies. Decreased libido denies. Vulvar lesion denies. Prolapse of genital organs denies. Painful intercourse denies. Postcoital bleeding denies    Psychiatric: Depression denies. Anxiety denies.     OB History    Para Term  AB Living   3 3 3 0 0 3   SAB IAB Ectopic Multiple Live Births   0 0 0 0 3      # 1 - Date: [birth year], Sex: Male, Weight: 3.538 kg (7 lb 12.8 oz), GA: None, Type: Vaginal, Spontaneous, Apgar1: None, Apgar5: None, Living: Living, Birth Comments: None    # 2 - Date: [birth year], Sex: Female, Weight: 3.629 kg (8 lb), GA: None, Type: Vaginal, Spontaneous, Apgar1: None, Apgar5: None, Living: Living, Birth Comments: None    # 3 - Date: [birth year], Sex: Female, Weight: 3.629 kg (8 lb), GA: None, Type: Vaginal, Spontaneous, Apgar1: None, Apgar5: None, Living: Living, Birth Comments: None      Past Medical History:   Diagnosis Date    Abnormal cardiovascular stress test     Anxiety disorder, unspecified     Asthma     CAD (coronary artery disease)     CHF (congestive heart failure)     Chronic atrial fibrillation     Chronic pain     Chronic venous insufficiency     Diabetes mellitus, type 2     Dizziness     DVT, lower extremity     history left leg    Dyslipidemia (high LDL; low HDL)     Edema of leg     Hyperkalemia     Hyperlipidemia     Hypertension     Left ventricular systolic dysfunction     Low back pain     MDD (major depressive disorder)     Mild mitral valve regurgitation     Smoker     SOB (shortness of breath)     Venous insufficiency of both lower extremities      Current Medications[1]    Review of patient's allergies indicates:  No Known Allergies    Past Surgical History:   Procedure Laterality Date    CARDIAC CATHETERIZATION      CORONARY ANGIOGRAPHY Right 2024    Procedure: ANGIOGRAM, CORONARY ARTERY;  Surgeon: Sahil Farr MD;  Location: [facility] CATH LAB;  Service: Cardiology;  Laterality: Right;    EYE SURGERY Bilateral     HYSTERECTOMY      LEFT HEART  "CATHETERIZATION Left 5/23/2024    Procedure: Left heart cath;  Surgeon: Sahil Farr MD;  Location: [facility] CATH LAB;  Service: Cardiology;  Laterality: Left;     Family History   Problem Relation Name Age of Onset    Heart disease Mother Libia willis     Brain cancer Brother      Diabetes type II Brother      Kidney failure Brother      Alcohol abuse Brother Balaji willis     Breast cancer Maternal Cousin          age unknown    Colon cancer Neg Hx      Ovarian cancer Neg Hx      Uterine cancer Neg Hx       Social History[2]      Physical Exam:  /78 (BP Location: Left arm, Patient Position: Sitting)   Temp 96.8 °F (36 °C) (Temporal)   Ht 5' 6" (1.676 m)   Wt 108.4 kg (239 lb)   BMI 38.58 kg/m²     Chaperone: present.       General appearance: healthy, well-nourished and well-developed     Psychiatric: Orientation to time, place and person. Normal mood and affect and active, alert     Skin: Appearance: no rashes or lesions.     Neck:   Neck: supple, FROM, trachea midline. and no masses   Thyroid: no enlargement or nodules and non-tender.       Cardiovascular:   Auscultation: RRR and no murmur.   Peripheral Vascular: no varicosities, LLE edema, RLE edema, calf tenderness, and palpable cord and pedal pulses intact.     Lungs:   Respiratory effort: no intercostal retractions or accessory muscle usage.   Auscultation: no wheezing, rales/crackles, or rhonchi and clear to auscultation.     Breast:   Inspection/Palpation: no tenderness, discrete/distinct masses, skin changes, or abnormal secretions. Nipple appearance normal.     Abdomen:   Auscultation/Inspection/Palpation: no hepatomegaly, splenomegaly, masses, tenderness or CVA tenderness and soft, non-distended bowel sounds preset.    Hernia: no palpable hernias.     Female Genitalia:    Vulva: no masses, tenderness or lesions    Bladder/Urethra: no urethral discharge or mass, normal meatus, bladder non-distended.    Vagina: no tenderness, erythema, cystocele, " rectocele, abnormal vaginal discharge or vesicle(s) or ulcers    Cuff intact, no masses, NT   Adnexa/Parametria: no parametrial tenderness or mass, no adnexal tenderness or ovarian mass.     Lymph Nodes:   Palpation: non tender submandibular nodes, axillary nodes, or inguinal nodes.     Rectal Exam:   Rectum: normal perianal skin.       Assessment/Plan  1. Well woman exam with routine gynecological exam  -     Ambulatory referral/consult to Gynecology    2. Vaginal odor  -     POCT Urinalysis, Dipstick, Automated, W/O Scope  -     Urine Culture High Risk  -     MDL Sendout Test  -     POCT Wet Prep  -     POCT KOH    3. Acute cystitis with hematuria  -     Urine Culture High Risk  -     nitrofurantoin, macrocrystal-monohydrate, (MACROBID) 100 MG capsule; Take 1 capsule (100 mg total) by mouth 2 (two) times daily. for 7 days  Dispense: 14 capsule; Refill: 0    4. Screening examination for STD (sexually transmitted disease)  -     Trichomonas vaginalis Amplified RNA  -     C.trach/N.gonor AMP RNA    5. H/O total hysterectomy       No pap, s/p hyst, no h/o abnl pap   Recommend BSE monthly  Discussed recommendations of annual screening after age of 40 with mammogram  Explained that screening is not 100% reliable. Advised patient if she notices any changes to her breast including a lump, mass, dimpling, discharge, rash, or tenderness she should contact us immediately.     Healthy diet, exercise   MMG-done  Multivitamin   Seat belt   Sunscreen use   Safe sex/ STI education   Annual labs: per PCP  Colonoscopy: per PCP    Wet prep negative  One swab for myco and urea  Aptima for GC, CZ, TV    Educated, UA  Discussed urinalysis results and patients symptoms  Culture sent to lab  Advised to to call if symptoms do not improve within 24 hrs or if she develops fever  Rx: Macrobid    Discussed the various types of STDs, related symptoms and the potential consequences (including effects on fertility) of STD infections.        Reviewed ways to limit exposure and prevention techniques.       Cultures: gc/cz/tv    Labs: declined      Will call pt with swab results  RTC PRN or for annual.                  [1]   Current Outpatient Medications:     alcohol swabs (ALCOHOL PREP PADS) PadM, USE 1 TIME DAILY, Disp: 100 each, Rfl: 11    apixaban (ELIQUIS) 5 mg Tab, Take 1 tablet (5 mg total) by mouth 2 (two) times a day., Disp: 180 tablet, Rfl: 1    aspirin (ECOTRIN) 81 MG EC tablet, Take 81 mg by mouth once daily., Disp: , Rfl:     atorvastatin (LIPITOR) 40 MG tablet, Take 40 mg by mouth every evening., Disp: , Rfl:     blood sugar diagnostic Strp, To check BG 1 times daily, to use with insurance preferred meter, Disp: 200 each, Rfl: 3    blood-glucose meter kit, To check BG 1 times daily, to use with insurance preferred meter, Disp: 3 each, Rfl: 3    carvediloL (COREG) 25 MG tablet, Take 1 tablet (25 mg total) by mouth 2 (two) times daily., Disp: 60 tablet, Rfl: 1    ibuprofen-diphenhydrAMINE cit (IBUPROFEN PM) 200-38 mg Tab, Take 2 tablets by mouth nightly., Disp: , Rfl:     isosorbide mononitrate (IMDUR) 30 MG 24 hr tablet, Take 1 tablet (30 mg total) by mouth once daily., Disp: 90 tablet, Rfl: 3    lancets Misc, To check BG 1 times daily, to use with insurance preferred meter, Disp: 200 each, Rfl: 3    ONETOUCH DELICA PLUS LANCET 33 gauge Misc, , Disp: , Rfl:     sacubitriL-valsartan (ENTRESTO)  mg per tablet, Take 1 tablet by mouth 2 (two) times daily., Disp: 14 tablet, Rfl: 0    semaglutide (OZEMPIC) 1 mg/dose (4 mg/3 mL), Inject 1 mg into the skin every 7 days., Disp: 12 mL, Rfl: 3    nitrofurantoin, macrocrystal-monohydrate, (MACROBID) 100 MG capsule, Take 1 capsule (100 mg total) by mouth 2 (two) times daily. for 7 days, Disp: 14 capsule, Rfl: 0  No current facility-administered medications for this visit.    Facility-Administered Medications Ordered in Other Visits:     aspirin tablet 325 mg, 325 mg, Oral, On Call Procedure,  Sahil Farr MD, 325 mg at 24 0846    diazePAM tablet 10 mg, 10 mg, Oral, On Call Procedure, Sahil Farr MD, 10 mg at 24 0847    diphenhydrAMINE capsule 50 mg, 50 mg, Oral, On Call Procedure, Sahil Farr MD, 50 mg at 24 0847  [2]   Social History  Socioeconomic History    Marital status:    Tobacco Use    Smoking status: Former     Current packs/day: 0.00     Types: Cigarettes     Quit date: 2022     Years since quittin.3     Passive exposure: Never    Smokeless tobacco: Never   Substance and Sexual Activity    Alcohol use: Yes     Alcohol/week: 4.0 standard drinks of alcohol     Types: 4 Glasses of wine per week     Comment: 2-3 glasses wine every 2 weeks    Drug use: Not Currently    Sexual activity: Not Currently     Partners: Female     Birth control/protection: None     Social Drivers of Health     Financial Resource Strain: Low Risk  (2024)    Overall Financial Resource Strain (CARDIA)     Difficulty of Paying Living Expenses: Not very hard   Food Insecurity: No Food Insecurity (2024)    Hunger Vital Sign     Worried About Running Out of Food in the Last Year: Never true     Ran Out of Food in the Last Year: Never true   Transportation Needs: No Transportation Needs (2024)    TRANSPORTATION NEEDS     Transportation : No   Physical Activity: Inactive (2024)    Exercise Vital Sign     Days of Exercise per Week: 0 days     Minutes of Exercise per Session: 0 min   Stress: No Stress Concern Present (2024)    [employer] - Occupational Stress Questionnaire     Feeling of Stress : Only a little   Housing Stability: Unknown (2024)    Housing Stability Vital Sign     Unable to Pay for Housing in the Last Year: No     Homeless in the Last Year: No

## 2025-03-12 ENCOUNTER — TELEPHONE (OUTPATIENT)
Dept: PULMONOLOGY | Facility: CLINIC | Age: 65
End: 2025-03-12

## 2025-03-12 ENCOUNTER — RESULTS FOLLOW-UP (OUTPATIENT)
Dept: OBSTETRICS AND GYNECOLOGY | Facility: CLINIC | Age: 65
End: 2025-03-12

## 2025-03-12 LAB
C TRACH RRNA SPEC QL NAA+PROBE: NEGATIVE
N GONORRHOEA RRNA SPEC QL NAA+PROBE: NEGATIVE
SPECIMEN SOURCE: NORMAL
T VAGINALIS RRNA SPEC QL NAA+PROBE: NEGATIVE

## 2025-03-12 NOTE — TELEPHONE ENCOUNTER
Spoke with patient and she asked about a co-pay for her visit in this clinic tomorrow,told her I have no way of knowing that information. Informed her to please call the number on the back of her insurance card and ask for co-pay amount.  Stated that if co-pay was too much, she would call us back and cancel her appt.  Verbalized understanding            ----- Message from Marely sent at 3/12/2025 12:25 PM CDT -----  Contact: self  Type:  Patient Returning CallWho Called:Lakesha Ceballos Left Message for Patient:unsureDoes the patient know what this is regarding?:co-payWould the patient rather a call back or a response via [facility]? [facility] Call Back Number:[phone] Information: n/a

## 2025-03-13 LAB — BACTERIA UR CULT: ABNORMAL

## 2025-03-16 ENCOUNTER — HOSPITAL ENCOUNTER (EMERGENCY)
Facility: HOSPITAL | Age: 65
Discharge: HOME | End: 2025-03-16
Attending: FAMILY MEDICINE
Payer: COMMERCIAL

## 2025-03-16 VITALS
HEIGHT: 66 IN | WEIGHT: 239 LBS | TEMPERATURE: 97 F | BODY MASS INDEX: 38.41 KG/M2 | HEART RATE: 66 BPM | OXYGEN SATURATION: 96 % | SYSTOLIC BLOOD PRESSURE: 143 MMHG | DIASTOLIC BLOOD PRESSURE: 96 MMHG | RESPIRATION RATE: 20 BRPM

## 2025-03-16 DIAGNOSIS — H16.002 CORNEAL ULCER OF LEFT EYE: Primary | ICD-10-CM

## 2025-03-16 PROCEDURE — 99284 EMERGENCY DEPT VISIT MOD MDM: CPT

## 2025-03-16 PROCEDURE — 25000003 PHARM REV CODE 250

## 2025-03-16 RX ORDER — KETOROLAC TROMETHAMINE 5 MG/ML
1 SOLUTION OPHTHALMIC EVERY 6 HOURS
Qty: 5 ML | Refills: 0 | Status: SHIPPED | OUTPATIENT
Start: 2025-03-16 | End: 2025-03-26

## 2025-03-16 RX ORDER — PROPARACAINE HYDROCHLORIDE 5 MG/ML
1 SOLUTION/ DROPS OPHTHALMIC
Status: COMPLETED | OUTPATIENT
Start: 2025-03-16 | End: 2025-03-16

## 2025-03-16 RX ORDER — ERYTHROMYCIN 5 MG/G
OINTMENT OPHTHALMIC
Qty: 3.5 G | Refills: 0 | Status: SHIPPED | OUTPATIENT
Start: 2025-03-16

## 2025-03-16 RX ADMIN — PROPARACAINE HYDROCHLORIDE 1 DROP: 5 SOLUTION/ DROPS OPHTHALMIC at 11:03

## 2025-03-16 RX ADMIN — FLUORESCEIN SODIUM 1 EACH: 1 STRIP OPHTHALMIC at 11:03

## 2025-03-16 NOTE — ED PROVIDER NOTES
Encounter Date: 3/16/2025       History     Chief Complaint   Patient presents with    Eye Drainage     PT C/O left eye drainage and pain onset this am.      See MDM    The history is provided by the patient. No  was used.     Review of patient's allergies indicates:  No Known Allergies  Past Medical History:   Diagnosis Date    Abnormal cardiovascular stress test     Anxiety disorder, unspecified     Asthma     CAD (coronary artery disease)     CHF (congestive heart failure)     Chronic atrial fibrillation     Chronic pain     Chronic venous insufficiency     Diabetes mellitus, type 2     Dizziness     DVT, lower extremity     history left leg    Dyslipidemia (high LDL; low HDL)     Edema of leg     Hyperkalemia     Hyperlipidemia     Hypertension     Left ventricular systolic dysfunction     Low back pain     MDD (major depressive disorder)     Mild mitral valve regurgitation     Smoker     SOB (shortness of breath)     Venous insufficiency of both lower extremities      Past Surgical History:   Procedure Laterality Date    CARDIAC CATHETERIZATION      CORONARY ANGIOGRAPHY Right 5/23/2024    Procedure: ANGIOGRAM, CORONARY ARTERY;  Surgeon: Sahil Farr MD;  Location: [facility] CATH LAB;  Service: Cardiology;  Laterality: Right;    EYE SURGERY Bilateral 2021    HYSTERECTOMY      LEFT HEART CATHETERIZATION Left 5/23/2024    Procedure: Left heart cath;  Surgeon: Sahil Farr MD;  Location: [facility] CATH LAB;  Service: Cardiology;  Laterality: Left;     Family History   Problem Relation Name Age of Onset    Heart disease Mother Libia willis     Brain cancer Brother      Diabetes type II Brother      Kidney failure Brother      Alcohol abuse Brother Balaji willis     Breast cancer Maternal Cousin          age unknown    Colon cancer Neg Hx      Ovarian cancer Neg Hx      Uterine cancer Neg Hx       Social History[1]  Review of Systems   Eyes:  Positive for pain and discharge. Negative for photophobia,  redness, itching and visual disturbance.   All other systems reviewed and are negative.      Physical Exam     Initial Vitals [03/16/25 1002]   BP Pulse Resp Temp SpO2   (!) 152/124 98 18 97.2 °F (36.2 °C) 96 %      MAP       --         Physical Exam    Nursing note and vitals reviewed.  Constitutional: She appears well-developed and well-nourished. She is not diaphoretic. No distress.   HENT:   Head: Normocephalic and atraumatic.   Eyes: Conjunctivae and EOM are normal. Pupils are equal, round, and reactive to light.   Left eye ulcer at 12 o' clock position, again visible with fluorescein and wood's lamp. No leak. No foreign body. No hypopyon.    Cardiovascular:  Normal rate.           Pulmonary/Chest: No respiratory distress.     Neurological: She is alert and oriented to person, place, and time.   Skin: Skin is warm and dry.   Psychiatric: She has a normal mood and affect.         ED Course   Procedures  Labs Reviewed - No data to display       Imaging Results    None          Medications   fluorescein ophthalmic strip 1 each (has no administration in time range)   proparacaine 0.5 % ophthalmic solution 1 drop (has no administration in time range)     Medical Decision Making  Pt is a 65 y/o female who presents with left eye pain for the past month. States that it comes and goes, has seen her eye doctor for this problem and was prescribed steroid drops and visine drops. Has upcoming appointment on 3/19, however she couldn't take the pain. States pain is a 3/10, however last night was a 10/10. Denies known foreign body in the eye. Admits that she has been scratching the eye frequently. Does not wear contacts. S/p b/l cataract surgery approximately 2 years ago.     Corneal ulcer seen on exam, verified with fluorescein. Will send home with ophthalmic antibiotic and toradol drops. Pt has f/u with ophthalmologist scheduled on 3/19. Instructed to not rub or scratch eye. Strict ED precautions given. Pt expressed  understanding and were in agreement with the plan.    Risk  Prescription drug management.      Additional MDM:   Differential Diagnosis:   Other: The following diagnoses were also considered and will be evaluated: corneal abrasion, dendritic ulcer and conjunctivitis.                                   Clinical Impression:  Final diagnoses:  [H16.002] Corneal ulcer of left eye (Primary)          ED Disposition Condition    Discharge Stable          ED Prescriptions       Medication Sig Dispense Start Date End Date Auth. Provider    erythromycin (ROMYCIN) ophthalmic ointment Place a 1/2 inch ribbon of ointment into the lower eyelid. 3.5 g 3/16/2025 -- Radha Leonard PA    ketorolac 0.5% (ACULAR) 0.5 % Drop Place 1 drop into both eyes every 6 (six) hours. for 10 days 5 mL 3/16/2025 3/26/2025 Radha Leonard PA          Follow-up Information       Follow up With Specialties Details Why Contact Info    Layne Daniel FNP-C Family Medicine Call in 1 week  [address]  [address]  [facility]  [facility] [address]  [phone]                 [1]   Social History  Tobacco Use    Smoking status: Former     Current packs/day: 0.00     Types: Cigarettes     Quit date: 2022     Years since quittin.3     Passive exposure: Never    Smokeless tobacco: Never   Substance Use Topics    Alcohol use: Yes     Alcohol/week: 4.0 standard drinks of alcohol     Types: 4 Glasses of wine per week     Comment: 2-3 glasses wine every 2 weeks    Drug use: Not Currently        Radha Leonard PA  [id number]     Post-Care Instructions: Reviewed with patient post-care instructions. Patient  to keep the biopsy site dry overnight, and then apply Vaseline 1-2x daily until healed.

## 2025-03-17 ENCOUNTER — TELEPHONE (OUTPATIENT)
Dept: OBSTETRICS AND GYNECOLOGY | Facility: CLINIC | Age: 65
End: 2025-03-17
Payer: COMMERCIAL

## 2025-03-21 ENCOUNTER — TELEPHONE (OUTPATIENT)
Dept: FAMILY MEDICINE | Facility: CLINIC | Age: 65
End: 2025-03-21
Payer: COMMERCIAL

## 2025-03-31 ENCOUNTER — TELEPHONE (OUTPATIENT)
Dept: FAMILY MEDICINE | Facility: CLINIC | Age: 65
End: 2025-03-31
Payer: COMMERCIAL

## 2025-03-31 NOTE — TELEPHONE ENCOUNTER
Someone from [facility] called, I did not get aw name and her English was very broken, said patient  needed a PA and to call [phone].  When I called the number, Evelyn (the rep on the phone) said she was unsure of what was needed.

## 2025-04-01 ENCOUNTER — TELEPHONE (OUTPATIENT)
Dept: FAMILY MEDICINE | Facility: CLINIC | Age: 65
End: 2025-04-01
Payer: COMMERCIAL

## 2025-04-01 DIAGNOSIS — Z79.4 TYPE 2 DIABETES MELLITUS WITHOUT COMPLICATION, WITH LONG-TERM CURRENT USE OF INSULIN: Primary | ICD-10-CM

## 2025-04-01 DIAGNOSIS — E11.9 TYPE 2 DIABETES MELLITUS WITHOUT COMPLICATION, WITH LONG-TERM CURRENT USE OF INSULIN: Primary | ICD-10-CM

## 2025-04-01 RX ORDER — SEMAGLUTIDE 2.68 MG/ML
2 INJECTION, SOLUTION SUBCUTANEOUS
Qty: 2 ML | Refills: 11 | Status: SHIPPED | OUTPATIENT
Start: 2025-04-01 | End: 2026-04-01

## 2025-04-21 ENCOUNTER — TELEPHONE (OUTPATIENT)
Dept: PULMONOLOGY | Facility: CLINIC | Age: 65
End: 2025-04-21
Payer: COMMERCIAL

## 2025-04-29 ENCOUNTER — PATIENT OUTREACH (OUTPATIENT)
Facility: CLINIC | Age: 65
End: 2025-04-29
Payer: COMMERCIAL

## 2025-04-29 LAB
LEFT EYE DM RETINOPATHY: NEGATIVE
RIGHT EYE DM RETINOPATHY: NEGATIVE

## 2025-04-29 NOTE — PROGRESS NOTES
Health Maintenance Topic(s) Outreach Outcomes & Actions Taken:    Eye Exam - Outreach Outcomes & Actions Taken  : Diabetic Eye External Records Uploaded, Care Team & History Updated if Applicable       Additional Notes:  Upload Diabetic Eye Exam 4/29/2025 Dr BRETT Lantigua

## 2025-05-02 DIAGNOSIS — Z79.4 TYPE 2 DIABETES MELLITUS WITHOUT COMPLICATION, WITH LONG-TERM CURRENT USE OF INSULIN: ICD-10-CM

## 2025-05-02 DIAGNOSIS — E11.9 TYPE 2 DIABETES MELLITUS WITHOUT COMPLICATION, WITH LONG-TERM CURRENT USE OF INSULIN: ICD-10-CM

## 2025-05-02 RX ORDER — SEMAGLUTIDE 2.68 MG/ML
2 INJECTION, SOLUTION SUBCUTANEOUS
Qty: 2 ML | Refills: 11 | Status: SHIPPED | OUTPATIENT
Start: 2025-05-02 | End: 2026-05-02

## 2025-05-08 ENCOUNTER — OFFICE VISIT (OUTPATIENT)
Dept: PULMONOLOGY | Facility: CLINIC | Age: 65
End: 2025-05-08
Payer: COMMERCIAL

## 2025-05-08 VITALS
HEART RATE: 67 BPM | HEIGHT: 66 IN | DIASTOLIC BLOOD PRESSURE: 80 MMHG | RESPIRATION RATE: 18 BRPM | OXYGEN SATURATION: 96 % | WEIGHT: 239 LBS | BODY MASS INDEX: 38.41 KG/M2 | SYSTOLIC BLOOD PRESSURE: 142 MMHG

## 2025-05-08 DIAGNOSIS — E66.9 OBESITY, UNSPECIFIED CLASS, UNSPECIFIED OBESITY TYPE, UNSPECIFIED WHETHER SERIOUS COMORBIDITY PRESENT: ICD-10-CM

## 2025-05-08 DIAGNOSIS — Z72.821 INADEQUATE SLEEP HYGIENE: ICD-10-CM

## 2025-05-08 DIAGNOSIS — G47.33 OSA ON CPAP: Primary | ICD-10-CM

## 2025-05-08 DIAGNOSIS — F51.04 CHRONIC INSOMNIA: ICD-10-CM

## 2025-05-08 NOTE — PROGRESS NOTES
Pulmonary Medicine Clinic Note    Patient Identification:   Patient ID: Lakesha Caballero is a 64 y.o. female.    Referring Provider:  Layne Daniel    Chief Complaint:  Sleep Apnea      History of Present Illness:    Lakesha Caballero is a 64 y.o. female who presents for the management of sleep disordered breathing.  Patient had a sleep study done in December 2024 which showed severe obstructive sleep apnea with an AHI of 57.2 events per hour without clinically significant nocturnal hypoxia.  Her sleep apnea was REM predominant.  She was given a CPAP which according to her was broken and was recently fixed.  She tells me no issues with using her CPAP.  Her sleep routine is not 1 of the greatest ones.  She watches TV until she falls asleep around midnight.  She does not sleep more than 4-5 hours.  She also takes over-the-counter melatonin.  He is currently on oral nasal mask and has no complaints about it.  She wakes up with or without alarms.  Upon awakening mostly she feels rested.  Sometimes she feels dizzy due to melatonin side effect she thinks.  She does have cardiac issues and history of a DVT for which he is chronically anticoagulated with Eliquis.  Medications show statin, beta blocker, Entresto and p.r.n. nitroglycerin.  She consumes not more than 2 cups of coffee during the day.  Lately she has switched it to decaffeinated beverages.  He occasionally has right-sided chest pain that is relieved with the an over-the-counter cold flu relief syrup.  She denies any family history of sleep disordered breathing.  She stopped smoking 4 years ago and also used to heavily drink but quit many years ago.  He denies any upper airway or nasal surgeries.  Her Mallampati class is 2 and a BMI is 38.58 kilos per m2.  Her current [place] Sleepiness scale score is only 4.  She claims to be compliant and her DME has reported 87% more than 4 hour usage but we do not have confirmatory compliance report available yet.    Review  of Systems:  Review of Systems   Constitutional:  Negative for fever, chills, weight loss, weight gain, activity change, appetite change, fatigue, night sweats and weakness.   HENT:  Negative for nosebleeds, postnasal drip, rhinorrhea, sinus pressure, sore throat, trouble swallowing, voice change, congestion, ear pain and hearing loss.    Eyes:  Negative for redness and itching.   Respiratory:  Negative for cough, hemoptysis, sputum production, choking, chest tightness, shortness of breath, wheezing, orthopnea, previous hospitialization due to pulmonary problems, asthma nighttime symptoms, pleurisy, dyspnea on extertion, use of rescue inhaler and Paroxysmal Nocturnal Dyspnea.    Cardiovascular:  Negative for chest pain, palpitations and leg swelling.   Genitourinary:  Negative for difficulty urinating and hematuria.   Endocrine:  Negative for polydipsia, polyphagia, cold intolerance, heat intolerance and polyuria.    Musculoskeletal:  Negative for arthralgias, back pain, gait problem, joint swelling and myalgias.   Skin:  Negative for rash.   Gastrointestinal:  Negative for nausea, vomiting, abdominal pain, abdominal distention and acid reflux.   Neurological:  Negative for dizziness, syncope, weakness, light-headedness and headaches.   Hematological:  Negative for adenopathy. Does not bruise/bleed easily and no excessive bruising.   Psychiatric/Behavioral:  Negative for confusion and sleep disturbance. The patient is not nervous/anxious.          Allergies: Review of patient's allergies indicates:  No Known Allergies    Medications:      Past Medical History:      Past Medical History:   Diagnosis Date    Abnormal cardiovascular stress test     Anxiety disorder, unspecified     Asthma     CAD (coronary artery disease)     CHF (congestive heart failure)     Chronic atrial fibrillation     Chronic pain     Chronic venous insufficiency     Diabetes mellitus, type 2     Dizziness     DVT, lower extremity     history  left leg    Dyslipidemia (high LDL; low HDL)     Edema of leg     Hyperkalemia     Hyperlipidemia     Hypertension     Left ventricular systolic dysfunction     Low back pain     MDD (major depressive disorder)     Mild mitral valve regurgitation     Smoker     SOB (shortness of breath)     Venous insufficiency of both lower extremities        Family History:      Family History   Problem Relation Name Age of Onset    Heart disease Mother Libia willis     Brain cancer Brother      Diabetes type II Brother      Kidney failure Brother      Alcohol abuse Brother Balaji willis     Breast cancer Maternal Cousin          age unknown    Colon cancer Neg Hx      Ovarian cancer Neg Hx      Uterine cancer Neg Hx          Social History:      Past Surgical History:   Procedure Laterality Date    CARDIAC CATHETERIZATION      CORONARY ANGIOGRAPHY Right 5/23/2024    Procedure: ANGIOGRAM, CORONARY ARTERY;  Surgeon: Sahil Farr MD;  Location: [facility] CATH LAB;  Service: Cardiology;  Laterality: Right;    EYE SURGERY Bilateral 2021    HYSTERECTOMY      LEFT HEART CATHETERIZATION Left 5/23/2024    Procedure: Left heart cath;  Surgeon: Sahil Farr MD;  Location: [facility] CATH LAB;  Service: Cardiology;  Laterality: Left;       Physical Exam:  Vitals:    05/08/25 0900   BP: (!) 142/80   Pulse: 67   Resp: 18     Physical Exam   Constitutional: She is oriented to person, place, and time. She appears not cachectic. No distress. She is obese.   HENT:   Head: Normocephalic.   Nose: Nose normal. No mucosal edema. No polyps.   Mouth/Throat: Oropharynx is clear and moist. Normal dentition. No oropharyngeal exudate. Mallampati Score: II.   Neck: No JVD present. No tracheal deviation present. No thyromegaly present.   Cardiovascular: Normal rate, regular rhythm, normal heart sounds and intact distal pulses. Exam reveals no gallop and no friction rub.   No murmur heard.  Pulmonary/Chest: Normal expansion, symmetric chest wall expansion, effort  normal and breath sounds normal. No stridor. No respiratory distress. She has no decreased breath sounds. She has no wheezes. She has no rhonchi. She has no rales. Chest wall is not dull to percussion. She exhibits no tenderness. Negative for egophony. Negative for tactile fremitus.   Abdominal: Soft. Bowel sounds are normal. She exhibits no distension and no mass. There is no hepatosplenomegaly. There is no abdominal tenderness. There is no rebound and no guarding. No hernia.   Musculoskeletal:         General: No tenderness, deformity or edema. Normal range of motion.      Cervical back: Normal range of motion and neck supple.   Lymphadenopathy: No supraclavicular adenopathy is present.     She has no cervical adenopathy.     She has no axillary adenopathy.   Neurological: She is alert and oriented to person, place, and time. She displays normal reflexes. No cranial nerve deficit. She exhibits normal muscle tone.   Skin: Skin is warm and dry. No rash noted. She is not diaphoretic. No cyanosis or erythema. No pallor. Nails show no clubbing.   Psychiatric: She has a normal mood and affect. Her behavior is normal. Judgment and thought content normal.         Accessory Clinical Data:  Previous Sleep Study findings:  Severe CORA with AHI of 57 without significant nocturnal hypoxia    ESS of 4         Assessment and Plan:    Problem List Items Addressed This Visit          Endocrine    Obesity     Other Visit Diagnoses         CORA on CPAP    -  Primary      Chronic insomnia          Inadequate sleep hygiene                 I am not sure what settings of CPAP patient is on and how effective her AHI control is as I do not have an official compliance report available.  However, it has been reported by the [employer] that patient is compliant by CMS criteria and patient herself confirms compliance and no issues with using her CPAP.  Her [place] Sleepiness scale score is only 4.  Recommend to continue using CPAP and try to  increase total sleep duration and correct her sleep hygiene.  Recommend against watching TV while in the bed and take melatonin in early hours for better efficacy.  Try to mid naps from the daytime if possible.  Currently takes occasional 30 minutes naps while in her recliner during the daytime.    Also recommend to lose weight with dietary and exercise modalities.  Pharmacotherapy maybe helpful.    45 minutes time was spent on this encounter which includes face-to-face time on counseling, performing appropriate examination including risk stratification, assessing daytime sleepiness, risk for sleep disordered breathing, counseling and educating the patient regarding the pathophysiology of obstructive sleep apnea, available diagnostic and treatment modalities, importance of compliance, techniques of desensitization etc., non face-to-face time in reviewing referring physicians notes, ordering appropriate follow-up sleep studies, documenting clinical information in the electronic medical record and care coordination.    Follow up in about 1 year (around 5/8/2026).  Thank you very much for involving me in the care of this patient.  Please do not hesitate to reach me if you have any further questions or concerns.    Addendum:     CPAP compliance report received and reviewed in depth.  Patient is currently on auto CPAP 7-20 cm of water with good control of AHI and her usage is around 5 hours and 20 minutes on average.  As mentioned above, duration of sleep or CPAP usage needs to be increased.  Aha control is excellent on current settings an average EPAP was around 8 cm of water.

## 2025-05-20 ENCOUNTER — OFFICE VISIT (OUTPATIENT)
Dept: FAMILY MEDICINE | Facility: CLINIC | Age: 65
End: 2025-05-20
Payer: COMMERCIAL

## 2025-05-20 VITALS
HEART RATE: 86 BPM | WEIGHT: 237 LBS | OXYGEN SATURATION: 98 % | HEIGHT: 66 IN | SYSTOLIC BLOOD PRESSURE: 122 MMHG | TEMPERATURE: 98 F | BODY MASS INDEX: 38.09 KG/M2 | DIASTOLIC BLOOD PRESSURE: 82 MMHG

## 2025-05-20 DIAGNOSIS — M54.12 CERVICAL RADICULOPATHY: Primary | ICD-10-CM

## 2025-05-20 DIAGNOSIS — M79.602 LEFT ARM PAIN: ICD-10-CM

## 2025-05-20 DIAGNOSIS — Z12.11 ENCOUNTER FOR COLORECTAL CANCER SCREENING: ICD-10-CM

## 2025-05-20 DIAGNOSIS — Z12.12 ENCOUNTER FOR COLORECTAL CANCER SCREENING: ICD-10-CM

## 2025-05-20 PROCEDURE — 99213 OFFICE O/P EST LOW 20 MIN: CPT | Mod: ,,, | Performed by: NURSE PRACTITIONER

## 2025-05-20 PROCEDURE — G2211 COMPLEX E/M VISIT ADD ON: HCPCS | Mod: ,,, | Performed by: NURSE PRACTITIONER

## 2025-05-20 PROCEDURE — 1125F AMNT PAIN NOTED PAIN PRSNT: CPT | Mod: ,,, | Performed by: NURSE PRACTITIONER

## 2025-05-20 PROCEDURE — 3008F BODY MASS INDEX DOCD: CPT | Mod: ,,, | Performed by: NURSE PRACTITIONER

## 2025-05-20 PROCEDURE — 4010F ACE/ARB THERAPY RXD/TAKEN: CPT | Mod: ,,, | Performed by: NURSE PRACTITIONER

## 2025-05-20 PROCEDURE — 1159F MED LIST DOCD IN RCRD: CPT | Mod: ,,, | Performed by: NURSE PRACTITIONER

## 2025-05-20 PROCEDURE — 1160F RVW MEDS BY RX/DR IN RCRD: CPT | Mod: ,,, | Performed by: NURSE PRACTITIONER

## 2025-05-20 PROCEDURE — 2023F DILAT RTA XM W/O RTNOPTHY: CPT | Mod: ,,, | Performed by: NURSE PRACTITIONER

## 2025-05-20 PROCEDURE — 3074F SYST BP LT 130 MM HG: CPT | Mod: ,,, | Performed by: NURSE PRACTITIONER

## 2025-05-20 PROCEDURE — 3044F HG A1C LEVEL LT 7.0%: CPT | Mod: ,,, | Performed by: NURSE PRACTITIONER

## 2025-05-20 PROCEDURE — 3060F POS MICROALBUMINURIA REV: CPT | Mod: ,,, | Performed by: NURSE PRACTITIONER

## 2025-05-20 PROCEDURE — 3066F NEPHROPATHY DOC TX: CPT | Mod: ,,, | Performed by: NURSE PRACTITIONER

## 2025-05-20 PROCEDURE — 3079F DIAST BP 80-89 MM HG: CPT | Mod: ,,, | Performed by: NURSE PRACTITIONER

## 2025-05-20 NOTE — PROGRESS NOTES
Patient ID: [id number]     Chief Complaint: Arm Pain      Subjective     Lakesha Caballero is a 64 y.o. female in the office for Arm Pain      History of Present Illness    CHIEF COMPLAINT:  Patient presents today for neck and arm pain.    MUSCULOSKELETAL:  She reports neck and arm pain for approximately 1-1.5 months, describing it as a sensation of a blood vessel or strain that is going to pop. She manages pain with Tylenol and icy hot ointment, which provides relief for about 5 hours, particularly at night. She previously attended physical therapy but discontinued due to transportation issues.    RESPIRATORY:  She has sleep apnea managed with nightly CPAP use. She experiences shortness of breath with physical activity and movement around the house, but denies associated chest pain.      ROS:  ROS as indicated in HPI.         Past Medical History:  has a past medical history of Abnormal cardiovascular stress test, Anxiety disorder, unspecified, Asthma, CAD (coronary artery disease), CHF (congestive heart failure), Chronic atrial fibrillation, Chronic pain, Chronic venous insufficiency, Diabetes mellitus, type 2, Dizziness, DVT, lower extremity, Dyslipidemia (high LDL; low HDL), Edema of leg, Hyperkalemia, Hyperlipidemia, Hypertension, Left ventricular systolic dysfunction, Low back pain, MDD (major depressive disorder), Mild mitral valve regurgitation, Smoker, SOB (shortness of breath), and Venous insufficiency of both lower extremities.    Social History:  reports that she quit smoking about 2 years ago. Her smoking use included cigarettes. She has never been exposed to tobacco smoke. She has never used smokeless tobacco. She reports current alcohol use of about 4.0 standard drinks of alcohol per week. She reports that she does not currently use drugs.    Current Outpatient Medications   Medication Instructions    alcohol swabs (ALCOHOL PREP PADS) PadM USE 1 TIME DAILY    apixaban (ELIQUIS) 5 mg, Oral, 2 times daily  "   aspirin (ECOTRIN) 81 mg, Daily    atorvastatin (LIPITOR) 40 mg, Nightly    blood sugar diagnostic Strp To check BG 1 times daily, to use with insurance preferred meter    blood-glucose meter kit To check BG 1 times daily, to use with insurance preferred meter    carvediloL (COREG) 25 mg, Oral, 2 times daily    ibuprofen-diphenhydrAMINE cit (IBUPROFEN PM) 200-38 mg Tab 2 tablets, Nightly    isosorbide mononitrate (IMDUR) 30 mg, Oral, Daily    lancets Misc To check BG 1 times daily, to use with insurance preferred meter    ONETOUCH DELICA PLUS LANCET 33 gauge Misc     OZEMPIC 2 mg, Subcutaneous, Every 7 days    sacubitriL-valsartan (ENTRESTO)  mg per tablet 1 tablet, Oral, 2 times daily       Patient has no known allergies.     Patient Care Team:  Layne Daniel FNP-C as PCP - General (Family Medicine)  Sahil Farr MD as Consulting Physician (Cardiology)  Kei Lantigua OD as Consulting Physician (Optometry)  Benjamin Jeffrey MD as Consulting Physician (Pulmonary Disease)     Objective     Visit Vitals  /82 (BP Location: Left arm)   Pulse 86   Temp 97.7 °F (36.5 °C) (Temporal)   Ht 5' 6" (1.676 m)   Wt 107.5 kg (237 lb)   SpO2 98%   BMI 38.25 kg/m²       Physical Exam  Vitals reviewed.   Constitutional:       General: She is not in acute distress.  Eyes:      General:         Left eye: Hordeolum present.  Cardiovascular:      Rate and Rhythm: Normal rate and regular rhythm.      Heart sounds: Normal heart sounds.   Pulmonary:      Effort: Pulmonary effort is normal.      Breath sounds: Normal breath sounds.   Musculoskeletal:      Cervical back: Crepitus present.   Lymphadenopathy:      Cervical: No cervical adenopathy.   Skin:     General: Skin is warm and dry.   Neurological:      Mental Status: She is alert and oriented to person, place, and time.   Psychiatric:         Mood and Affect: Mood normal.         Assessment & Plan     1. Cervical radiculopathy    2. Left arm pain    3. " Encounter for colorectal cancer screening  -     Cologuard Screening (Multitarget Stool DNA); Future; Expected date: 05/20/2025         Assessment & Plan    M54.12 Cervical radiculopathy  M79.602 Left arm pain  Z12.11, Z12.12 Encounter for colorectal cancer screening    IMPRESSION:  - Evaluated neck and shoulder pain, noting tenderness and palpable nodule in neck area.  - Assessed range of motion in affected arm, observing pain with certain movements.  - Considered potential causes of pain, including muscle strain or vascular issues.      M54.12 CERVICAL RADICULOPATHY:  - Reviewed current use of OTC pain relief methods (Tylenol, topical ointments).  -Given handout of neck exercises and encouraged to start 4-5 days a week.  Cont tylenol.  RTC 1 month if no improvement.     M79.602 LEFT ARM PAIN:  - Reviewed current use of OTC pain relief methods as noted above.          Follow up if symptoms worsen or fail to improve. In addition to their next scheduled appointment, the patient has also been instructed to follow up on as needed basis.     Future Appointments   Date Time Provider Department Center   2/6/2026  8:20 AM LAB, [facility] LABORATORY DRAW STATION [facility]   2/13/2026  2:30 PM Layne Daniel FNP-C [facility]   5/11/2026  8:20 AM Benjamin Jeffrey MD [facility] [address]        This note was generated with the assistance of ambient listening technology. Verbal consent was obtained by the patient and accompanying visitor(s) for the recording of patient appointment to facilitate this note. I attest to having reviewed and edited the generated note for accuracy, though some syntax or spelling errors may persist. Please contact the author of this note for any clarification.

## 2025-05-28 ENCOUNTER — TELEPHONE (OUTPATIENT)
Dept: FAMILY MEDICINE | Facility: CLINIC | Age: 65
End: 2025-05-28
Payer: COMMERCIAL

## 2025-06-22 DIAGNOSIS — E11.59 TYPE 2 DIABETES MELLITUS WITH OTHER CIRCULATORY COMPLICATION, WITHOUT LONG-TERM CURRENT USE OF INSULIN: ICD-10-CM

## 2025-06-22 PROBLEM — Z00.01 ENCOUNTER FOR ROUTINE ADULT HEALTH EXAMINATION WITH ABNORMAL FINDINGS: Status: ACTIVE | Noted: 2025-06-22

## 2025-06-23 RX ORDER — UBIQUINOL 100 MG
CAPSULE ORAL
Qty: 100 EACH | Refills: 2 | Status: SHIPPED | OUTPATIENT
Start: 2025-06-23

## 2025-06-25 ENCOUNTER — TELEPHONE (OUTPATIENT)
Dept: FAMILY MEDICINE | Facility: CLINIC | Age: 65
End: 2025-06-25
Payer: COMMERCIAL

## 2025-07-16 DIAGNOSIS — I10 HYPERTENSION, UNSPECIFIED TYPE: ICD-10-CM

## 2025-07-17 RX ORDER — ISOSORBIDE MONONITRATE 30 MG/1
30 TABLET, EXTENDED RELEASE ORAL
Qty: 90 TABLET | Refills: 3 | Status: SHIPPED | OUTPATIENT
Start: 2025-07-17

## (undated) DEVICE — INTRODUCER SHEATH 5FR W/.035

## (undated) DEVICE — KIT INTRODUCER STIFFEN MICRO

## (undated) DEVICE — KIT MANIFOLD LOW PRESS TUBING

## (undated) DEVICE — PAD DEFIB RADIOTRANSPARENT

## (undated) DEVICE — ANGIOTOUCH KIT

## (undated) DEVICE — SHEATH GLIDE SLENDER 6FR

## (undated) DEVICE — HEMOSTAT VASC BAND REG 24CM

## (undated) DEVICE — GUIDEWIRE INQWIRE SE 3MM JTIP

## (undated) DEVICE — Device

## (undated) DEVICE — CATH OPTITORQUE TIGER 5F 100CM

## (undated) DEVICE — ARMBOARD ADULT ARTERIAL